# Patient Record
Sex: FEMALE | Race: ASIAN | NOT HISPANIC OR LATINO | Employment: FULL TIME | URBAN - METROPOLITAN AREA
[De-identification: names, ages, dates, MRNs, and addresses within clinical notes are randomized per-mention and may not be internally consistent; named-entity substitution may affect disease eponyms.]

---

## 2020-09-02 ENCOUNTER — OFFICE VISIT - HEALTHEAST (OUTPATIENT)
Dept: FAMILY MEDICINE | Facility: CLINIC | Age: 60
End: 2020-09-02

## 2020-09-02 DIAGNOSIS — I10 BENIGN ESSENTIAL HYPERTENSION: ICD-10-CM

## 2020-09-02 DIAGNOSIS — C50.919 MALIGNANT NEOPLASM OF FEMALE BREAST, UNSPECIFIED ESTROGEN RECEPTOR STATUS, UNSPECIFIED LATERALITY, UNSPECIFIED SITE OF BREAST (H): ICD-10-CM

## 2020-09-02 DIAGNOSIS — Z13.220 LIPID SCREENING: ICD-10-CM

## 2020-09-02 DIAGNOSIS — L29.9 EAR ITCH: ICD-10-CM

## 2020-09-02 DIAGNOSIS — R73.9 ELEVATED BLOOD SUGAR: ICD-10-CM

## 2020-09-02 LAB
ALBUMIN SERPL-MCNC: 4.2 G/DL (ref 3.5–5)
ALP SERPL-CCNC: 92 U/L (ref 45–120)
ALT SERPL W P-5'-P-CCNC: 77 U/L (ref 0–45)
ANION GAP SERPL CALCULATED.3IONS-SCNC: 15 MMOL/L (ref 5–18)
AST SERPL W P-5'-P-CCNC: 45 U/L (ref 0–40)
BILIRUB SERPL-MCNC: 0.6 MG/DL (ref 0–1)
BUN SERPL-MCNC: 7 MG/DL (ref 8–22)
CALCIUM SERPL-MCNC: 9.1 MG/DL (ref 8.5–10.5)
CHLORIDE BLD-SCNC: 104 MMOL/L (ref 98–107)
CHOLEST SERPL-MCNC: 205 MG/DL
CO2 SERPL-SCNC: 20 MMOL/L (ref 22–31)
CREAT SERPL-MCNC: 0.77 MG/DL (ref 0.6–1.1)
FASTING STATUS PATIENT QL REPORTED: ABNORMAL
GFR SERPL CREATININE-BSD FRML MDRD: >60 ML/MIN/1.73M2
GLUCOSE BLD-MCNC: 208 MG/DL (ref 70–125)
HBA1C MFR BLD: 8.5 %
HDLC SERPL-MCNC: 46 MG/DL
LDLC SERPL CALC-MCNC: 131 MG/DL
POTASSIUM BLD-SCNC: 4.1 MMOL/L (ref 3.5–5)
PROT SERPL-MCNC: 7.6 G/DL (ref 6–8)
SODIUM SERPL-SCNC: 139 MMOL/L (ref 136–145)
TRIGL SERPL-MCNC: 142 MG/DL

## 2020-09-02 RX ORDER — IBUPROFEN 600 MG/1
600 TABLET, FILM COATED ORAL EVERY 6 HOURS PRN
Status: ON HOLD | COMMUNITY
Start: 2020-02-14 | End: 2022-01-23

## 2020-09-02 RX ORDER — ATORVASTATIN CALCIUM 20 MG/1
20 TABLET, FILM COATED ORAL DAILY
Status: SHIPPED | COMMUNITY
Start: 2020-08-31 | End: 2022-01-20

## 2020-09-02 RX ORDER — EXEMESTANE 25 MG/1
25 TABLET ORAL DAILY
Status: SHIPPED | COMMUNITY
Start: 2020-08-28

## 2020-09-02 ASSESSMENT — MIFFLIN-ST. JEOR: SCORE: 1034.89

## 2020-09-03 ENCOUNTER — AMBULATORY - HEALTHEAST (OUTPATIENT)
Dept: FAMILY MEDICINE | Facility: CLINIC | Age: 60
End: 2020-09-03

## 2020-09-03 ENCOUNTER — COMMUNICATION - HEALTHEAST (OUTPATIENT)
Dept: FAMILY MEDICINE | Facility: CLINIC | Age: 60
End: 2020-09-03

## 2020-09-03 DIAGNOSIS — E11.9 TYPE 2 DIABETES MELLITUS WITHOUT COMPLICATION, WITHOUT LONG-TERM CURRENT USE OF INSULIN (H): ICD-10-CM

## 2020-09-25 ENCOUNTER — RECORDS - HEALTHEAST (OUTPATIENT)
Dept: ADMINISTRATIVE | Facility: OTHER | Age: 60
End: 2020-09-25

## 2020-11-20 ENCOUNTER — OFFICE VISIT - HEALTHEAST (OUTPATIENT)
Dept: FAMILY MEDICINE | Facility: CLINIC | Age: 60
End: 2020-11-20

## 2020-11-20 DIAGNOSIS — K21.9 GASTRO-ESOPHAGEAL REFLUX DISEASE WITHOUT ESOPHAGITIS: ICD-10-CM

## 2020-11-24 ENCOUNTER — COMMUNICATION - HEALTHEAST (OUTPATIENT)
Dept: FAMILY MEDICINE | Facility: CLINIC | Age: 60
End: 2020-11-24

## 2020-11-24 DIAGNOSIS — I10 BENIGN ESSENTIAL HYPERTENSION: ICD-10-CM

## 2020-11-24 DIAGNOSIS — E11.9 TYPE 2 DIABETES MELLITUS WITHOUT COMPLICATION, WITHOUT LONG-TERM CURRENT USE OF INSULIN (H): ICD-10-CM

## 2020-12-08 ENCOUNTER — OFFICE VISIT - HEALTHEAST (OUTPATIENT)
Dept: FAMILY MEDICINE | Facility: CLINIC | Age: 60
End: 2020-12-08

## 2020-12-08 DIAGNOSIS — E11.9 TYPE 2 DIABETES MELLITUS WITHOUT COMPLICATION, WITHOUT LONG-TERM CURRENT USE OF INSULIN (H): ICD-10-CM

## 2020-12-08 DIAGNOSIS — I10 BENIGN ESSENTIAL HYPERTENSION: ICD-10-CM

## 2020-12-08 DIAGNOSIS — C50.919 MALIGNANT NEOPLASM OF FEMALE BREAST, UNSPECIFIED ESTROGEN RECEPTOR STATUS, UNSPECIFIED LATERALITY, UNSPECIFIED SITE OF BREAST (H): ICD-10-CM

## 2020-12-08 RX ORDER — GLIPIZIDE 5 MG/1
5 TABLET ORAL DAILY
Qty: 90 TABLET | Refills: 1 | Status: SHIPPED | OUTPATIENT
Start: 2020-12-08 | End: 2022-01-20

## 2020-12-10 ENCOUNTER — COMMUNICATION - HEALTHEAST (OUTPATIENT)
Dept: FAMILY MEDICINE | Facility: CLINIC | Age: 60
End: 2020-12-10

## 2020-12-10 DIAGNOSIS — E11.9 TYPE 2 DIABETES MELLITUS WITHOUT COMPLICATION, WITHOUT LONG-TERM CURRENT USE OF INSULIN (H): ICD-10-CM

## 2020-12-12 RX ORDER — GLUCOSAMINE HCL/CHONDROITIN SU 500-400 MG
CAPSULE ORAL
Qty: 100 STRIP | Refills: 3 | Status: SHIPPED | OUTPATIENT
Start: 2020-12-12

## 2020-12-21 ENCOUNTER — RECORDS - HEALTHEAST (OUTPATIENT)
Dept: ADMINISTRATIVE | Facility: OTHER | Age: 60
End: 2020-12-21

## 2021-02-25 ENCOUNTER — RECORDS - HEALTHEAST (OUTPATIENT)
Dept: ADMINISTRATIVE | Facility: OTHER | Age: 61
End: 2021-02-25

## 2021-03-09 ENCOUNTER — OFFICE VISIT - HEALTHEAST (OUTPATIENT)
Dept: FAMILY MEDICINE | Facility: CLINIC | Age: 61
End: 2021-03-09

## 2021-03-09 DIAGNOSIS — E11.9 TYPE 2 DIABETES MELLITUS WITHOUT COMPLICATION, WITHOUT LONG-TERM CURRENT USE OF INSULIN (H): ICD-10-CM

## 2021-03-09 DIAGNOSIS — G56.02 CARPAL TUNNEL SYNDROME OF LEFT WRIST: ICD-10-CM

## 2021-03-09 DIAGNOSIS — C50.919 MALIGNANT NEOPLASM OF FEMALE BREAST, UNSPECIFIED ESTROGEN RECEPTOR STATUS, UNSPECIFIED LATERALITY, UNSPECIFIED SITE OF BREAST (H): ICD-10-CM

## 2021-03-09 DIAGNOSIS — I10 BENIGN ESSENTIAL HYPERTENSION: ICD-10-CM

## 2021-03-09 RX ORDER — BLOOD PRESSURE TEST KIT
1 KIT MISCELLANEOUS DAILY
Qty: 1 EACH | Refills: 0 | Status: SHIPPED | OUTPATIENT
Start: 2021-03-09

## 2021-03-10 ENCOUNTER — COMMUNICATION - HEALTHEAST (OUTPATIENT)
Dept: FAMILY MEDICINE | Facility: CLINIC | Age: 61
End: 2021-03-10

## 2021-03-12 ENCOUNTER — AMBULATORY - HEALTHEAST (OUTPATIENT)
Dept: NURSING | Facility: CLINIC | Age: 61
End: 2021-03-12

## 2021-03-17 ENCOUNTER — COMMUNICATION - HEALTHEAST (OUTPATIENT)
Dept: FAMILY MEDICINE | Facility: CLINIC | Age: 61
End: 2021-03-17

## 2021-03-17 DIAGNOSIS — I10 BENIGN ESSENTIAL HYPERTENSION: ICD-10-CM

## 2021-04-02 ENCOUNTER — AMBULATORY - HEALTHEAST (OUTPATIENT)
Dept: NURSING | Facility: CLINIC | Age: 61
End: 2021-04-02

## 2021-05-10 ENCOUNTER — RECORDS - HEALTHEAST (OUTPATIENT)
Dept: ADMINISTRATIVE | Facility: OTHER | Age: 61
End: 2021-05-10

## 2021-05-13 ENCOUNTER — COMMUNICATION - HEALTHEAST (OUTPATIENT)
Dept: FAMILY MEDICINE | Facility: CLINIC | Age: 61
End: 2021-05-13

## 2021-05-13 DIAGNOSIS — I10 BENIGN ESSENTIAL HYPERTENSION: ICD-10-CM

## 2021-05-13 RX ORDER — LISINOPRIL 30 MG/1
TABLET ORAL
Qty: 90 TABLET | Refills: 2 | Status: SHIPPED | OUTPATIENT
Start: 2021-05-13 | End: 2022-01-20

## 2021-05-22 ENCOUNTER — COMMUNICATION - HEALTHEAST (OUTPATIENT)
Dept: FAMILY MEDICINE | Facility: CLINIC | Age: 61
End: 2021-05-22

## 2021-05-22 DIAGNOSIS — I10 BENIGN ESSENTIAL HYPERTENSION: ICD-10-CM

## 2021-06-05 VITALS
OXYGEN SATURATION: 95 % | WEIGHT: 129 LBS | HEIGHT: 58 IN | HEART RATE: 94 BPM | DIASTOLIC BLOOD PRESSURE: 90 MMHG | SYSTOLIC BLOOD PRESSURE: 168 MMHG | BODY MASS INDEX: 27.08 KG/M2

## 2021-06-05 VITALS
SYSTOLIC BLOOD PRESSURE: 158 MMHG | HEART RATE: 94 BPM | DIASTOLIC BLOOD PRESSURE: 80 MMHG | OXYGEN SATURATION: 98 % | BODY MASS INDEX: 24.45 KG/M2 | WEIGHT: 117 LBS

## 2021-06-05 VITALS
HEART RATE: 91 BPM | DIASTOLIC BLOOD PRESSURE: 80 MMHG | WEIGHT: 117 LBS | BODY MASS INDEX: 24.45 KG/M2 | OXYGEN SATURATION: 99 % | SYSTOLIC BLOOD PRESSURE: 160 MMHG

## 2021-06-11 NOTE — TELEPHONE ENCOUNTER
Patient Returning Call  Reason for call:  Call back  Information relayed to patient:  Writer relayed message to Pt's : Message from Elmer Emmanuel CNP sent at 9/3/2020  1:39 PM CDT -----  Please call the patient.          Elevated blood sugars which she knows about.  Her A1c which we would like closer to 7% is at 8.5%.  Kidneys are fine.  Mild increase in liver enzymes.  We will get these sent off to her oncologist like she requested.     In the meantime, I would like to start a medication called metformin.  She can slowly increase this medication over 2 weeks by following the directions on the bottle.  Hopefully this will be enough to keep her sugars under control while she is going through treatment, but we can always adjust as needed.  I double checked and this medication should not cause any interactions with her cancer therapy.     I would recommend that she check her blood sugar once a day in the morning before breakfast and write this number down so that we can review in 3 months.  If consistently >250, let me know.  If she needs blood testing supplies, let me know and I can get the sent to the pharmacy.     Check back in 3-months so that we can recheck labs and see how her sugars are progressing.     Elmer Emmanuel CNP      agrees, and understands.  Pt does need Diabetes supplies, and her Oncologist is Dr. Anne-Marie Giraldo @ [facility] in [place].     Patient has additional questions:  No  If YES, what are your questions/concerns:  N/A  Okay to leave a detailed message?: No call back needed

## 2021-06-11 NOTE — TELEPHONE ENCOUNTER
Left message to call back for: Pt.   Information to relay to patient:      1. Information below.     2. Please ask pt who her oncologist is and with what company so that we can fax results to them.    Of note. Lab results letter with information below has been mailed out.     Anusha Fritz [facility]

## 2021-06-11 NOTE — TELEPHONE ENCOUNTER
Prescription for generic glucometer, lancets, and strips sent to the pharmacy.  Please let them know.  The pharmacy should dispense all of the supplies based on her insurance.    Elmer Emmanuel, CNP

## 2021-06-11 NOTE — TELEPHONE ENCOUNTER
Labs internally faxed to Dr. Anne-Marie Giraldo.     Pt needs diabetic supplies. Please send in.     Anusha Fritz, CMA

## 2021-06-11 NOTE — PATIENT INSTRUCTIONS - HE
Let's increase your lisinopril to 15 mg.  I did send a new prescription to the pharmacy that reflects this.      If at your follow-up appointments with oncology your blood pressure is still >140/90, let us know and we can adjust your medication up.    We are getting some lab work done today to assess your kidney function and blood sugars.  Once we get these results back we will call you with the plan to get your blood sugars under control.

## 2021-06-11 NOTE — TELEPHONE ENCOUNTER
----- Message from Elmer Emmanuel CNP sent at 9/3/2020  1:39 PM CDT -----  Please call the patient.        Elevated blood sugars which she knows about.  Her A1c which we would like closer to 7% is at 8.5%.  Kidneys are fine.  Mild increase in liver enzymes.  We will get these sent off to her oncologist like she requested.    In the meantime, I would like to start a medication called metformin.  She can slowly increase this medication over 2 weeks by following the directions on the bottle.  Hopefully this will be enough to keep her sugars under control while she is going through treatment, but we can always adjust as needed.  I double checked and this medication should not cause any interactions with her cancer therapy.    I would recommend that she check her blood sugar once a day in the morning before breakfast and write this number down so that we can review in 3 months.  If consistently >250, let me know.  If she needs blood testing supplies, let me know and I can get the sent to the pharmacy.    Check back in 3-months so that we can recheck labs and see how her sugars are progressing.    Elmer Emmanuel CNP

## 2021-06-13 NOTE — PATIENT INSTRUCTIONS - HE
We can try cutting down the glipizide down to only the morning. If you notice sugars getting higher than 150, go ahead and restart twice daily.     I'm under the assumption that your A1c will be better once on the glipizide for at least 3 months. We'll check again in March.     You can try cutting the atorvastatin in half.

## 2021-06-13 NOTE — TELEPHONE ENCOUNTER
Medication Question or Clarification  Who is calling: Patient   What medication are you calling about (include dose and sig)?: metFORMIN (GLUCOPHAGE) 500 MG tablet 360 tablet 0 9/3/2020    Si tab AM daily x3 days then 1 AM 1PM x3 days then 2AM 1 PM x3 days then 2 tablets twice daily.   Sent to pharmacy as: metFORMIN 500 mg tablet (Glucophage)   Who prescribed the medication?: Elmer Emmanuel CNP  What is your question/concern?: Patient states that she is on metformin since September she is having stomach upset every day if she drinks something or eat pain in abdomin and gas comes up to her neck and having throat irritation . Patient went to urgent care for abdominal pain on 20. Per patient she is also on cancer medications. Patient also stated that her blood glucose levels are in between 140 to 130 . Patient is asking can provider prescribe alternative medication or decrease the metformin dose to one time a day . Please advise      Requested Pharmacy: [facility] # [id number]  Okay to leave a detailed message?: No

## 2021-06-13 NOTE — TELEPHONE ENCOUNTER
Request from [facility] pharmacy to refill patient's test strip and lancet prescriptions.    12/10/2020

## 2021-06-13 NOTE — TELEPHONE ENCOUNTER
Last Med Check: 09/02/2020    Next med check due on: 01/02/2021    CSA on File: N/A    Future Appointment Scheduled ? No    Last Med Refill? 09/02/2020    Booker Whitney LPN

## 2021-06-13 NOTE — TELEPHONE ENCOUNTER
Refill Approved    Rx renewed per Medication Renewal Policy. Medication was last renewed on 9/8/20, last OV 12/8/20.    Kasandra Ram, [facility] Triage/Med Refill 12/12/2020     Requested Prescriptions   Pending Prescriptions Disp Refills     blood glucose test strips 100 strip 0     Sig: Test once daily. Dispense brand per patient's insurance at pharmacy discretion.       Diabetic Supplies Refill Protocol Passed - 12/10/2020  4:08 PM        Passed - Visit with PCP or prescribing provider visit in last 6 months     Last office visit with prescriber/PCP: 12/8/2020 Elmer Emmanuel CNP OR same dept: 12/8/2020 Elmer Emmanuel CNP OR same specialty: 12/8/2020 Elmer Emmanuel CNP  Last physical: Visit date not found Last MTM visit: Visit date not found   Next visit within 3 mo: Visit date not found  Next physical within 3 mo: Visit date not found  Prescriber OR PCP: Elmer Emmanuel CNP  Last diagnosis associated with med order: 1. Type 2 diabetes mellitus without complication, without long-term current use of insulin (H)  - blood glucose test strips; Test once daily. Dispense brand per patient's insurance at pharmacy discretion.  Dispense: 100 strip; Refill: 0  - generic lancets (FINGERSTIX LANCETS); Test once daily. Dispense brand per patient's insurance at pharmacy discretion.  Dispense: 100 each; Refill: 0    If protocol passes may refill for 12 months if within 3 months of last provider visit (or a total of 15 months).             Passed - A1C in last 6 months     Hemoglobin A1c   Date Value Ref Range Status   09/02/2020 8.5 (H) <=5.6 % Final     Comment:     Normal <5.7% Prediabete 5.7-6.4% Diabletes 6.5% or higher - adopted from ADA consensus guidelines                  generic lancets (FINGERSTIX LANCETS) 100 each 0     Sig: Test once daily. Dispense brand per patient's insurance at pharmacy discretion.       Diabetic Supplies Refill Protocol Passed - 12/10/2020  4:08 PM        Passed - Visit with PCP  or prescribing provider visit in last 6 months     Last office visit with prescriber/PCP: 12/8/2020 Elmer Emmanuel CNP OR same dept: 12/8/2020 Elmer Emmanuel CNP OR same specialty: 12/8/2020 Elmer Emmanuel CNP  Last physical: Visit date not found Last MTM visit: Visit date not found   Next visit within 3 mo: Visit date not found  Next physical within 3 mo: Visit date not found  Prescriber OR PCP: Elmer Emmanuel CNP  Last diagnosis associated with med order: 1. Type 2 diabetes mellitus without complication, without long-term current use of insulin (H)  - blood glucose test strips; Test once daily. Dispense brand per patient's insurance at pharmacy discretion.  Dispense: 100 strip; Refill: 0  - generic lancets (FINGERSTIX LANCETS); Test once daily. Dispense brand per patient's insurance at pharmacy discretion.  Dispense: 100 each; Refill: 0    If protocol passes may refill for 12 months if within 3 months of last provider visit (or a total of 15 months).             Passed - A1C in last 6 months     Hemoglobin A1c   Date Value Ref Range Status   09/02/2020 8.5 (H) <=5.6 % Final     Comment:     Normal <5.7% Prediabete 5.7-6.4% Diabletes 6.5% or higher - adopted from ADA consensus guidelines

## 2021-06-13 NOTE — PROGRESS NOTES
Chief Complaint   Patient presents with     Diabetes     Recheck glucose log per Elmer's request       HPI: Patient presents today for diabetic check.  Initially put on Metformin but she was unable to taper up due to significant GI upset.  Switched over to glipizide.  Blood sugars have shown good control.  Previously fasting sugars were 160s now down to 120s.  No hypoglycemic events.  Taking glipizide 5 mg twice daily before meals.  Her chemotherapy has also been halved and she thinks this also is helping with her blood sugars.  No tremors.  Has had some oral pain from the chemo.  Has followed up with a dentist in the past for this.  Started to use Sensodyne toothpaste.  GI issues resolved once Metformin was discontinued.    ROS:Review of Systems - negative except for what's listed in the HPI    SH: The Patient's  reports that she has never smoked. She has never used smokeless tobacco. She reports previous alcohol use. She reports that she does not use drugs.      FH: The Patient's family history includes Breast cancer in her sister; Cancer in her mother; Hyperlipidemia in her brother; Hypertension in her mother; Lymphoma in her sister; Transient ischemic attack in her father.     Meds:    Current Outpatient Medications on File Prior to Visit   Medication Sig Dispense Refill     AFINITOR 10 mg Tab        atorvastatin (LIPITOR) 20 MG tablet Take 20 mg by mouth daily.       blood glucose meter (GLUCOMETER) Use 1 each As Directed as needed. Dispense glucometer, needle, and strip brand per patient's insurance at pharmacy discretion. 1 each 0     blood glucose test strips Test once daily. Dispense brand per patient's insurance at pharmacy discretion. 100 strip 0     exemestane (AROMASIN) 25 mg tablet TK 1 T PO QD PC       generic lancets (FINGERSTIX LANCETS) Test once daily. Dispense brand per patient's insurance at pharmacy discretion. 100 each 0     ibuprofen (ADVIL,MOTRIN) 600 MG tablet        metFORMIN (GLUCOPHAGE)  500 MG tablet 1 tab AM daily x3 days then 1 AM 1PM x3 days then 2AM 1 PM x3 days then 2 tablets twice daily. 360 tablet 0     omeprazole (PRILOSEC) 20 MG capsule Take 1 capsule (20 mg total) by mouth daily before breakfast. 30 minutes before meal 30 capsule 0     [DISCONTINUED] glipiZIDE (GLUCOTROL) 5 MG tablet Take 1 tablet (5 mg total) by mouth 2 (two) times a day before meals. 1/2 Hour BEFORE meals 60 tablet 1     [DISCONTINUED] lisinopriL (PRINIVIL,ZESTRIL) 10 MG tablet Take 1.5 tablets (15 mg total) by mouth daily. 135 tablet 0     No current facility-administered medications on file prior to visit.        O:  /80 (Patient Site: Right Arm, Patient Position: Sitting, Cuff Size: Adult Regular)   Pulse 94   Wt 117 lb (53.1 kg)   SpO2 98%   BMI 24.45 kg/m      Physical Examination:   General appearance - alert, well appearing, and in no distress  Mental status - alert, oriented to person, place, and time  Eyes -sclera white  Neurological - alert, oriented, normal speech, no focal findings or movement disorder noted, neck supple without rigidity, no tremors, strength 5/5  Extremities - no peripheral edema  Skin - normal coloration and turgor.      A/P:     Problem List Items Addressed This Visit        Edg Concept Cardiac Problems    Benign essential hypertension - Primary    Relevant Medications    lisinopriL (PRINIVIL,ZESTRIL) 10 MG tablet       Other    Breast cancer (H)      Other Visit Diagnoses     Type 2 diabetes mellitus without complication, without long-term current use of insulin (H)        Relevant Medications    glipiZIDE (GLUCOTROL) 5 MG tablet        Blood sugars look considerably better.  Sounds like this is also in conjunction with adjustments to her chemotherapy.  Okay to try cutting glipizide down to once daily in the morning per her request.  If sugars rise though, get back to twice daily.  A1c was checked after being on glipizide for only a couple weeks so no point in rechecking today.   Follow-up in the springtime.  Patient is hesitant to make adjustments to the lisinopril noting good blood pressure control outside of the clinic.  If elevated at oncology, should really increase lisinopril.        1. Type 2 diabetes mellitus without complication, without long-term current use of insulin (H)  - glipiZIDE (GLUCOTROL) 5 MG tablet; Take 1 tablet (5 mg total) by mouth daily. 1/2 Hour BEFORE meals  Dispense: 90 tablet; Refill: 1    2. Benign essential hypertension  - lisinopriL (PRINIVIL,ZESTRIL) 10 MG tablet; Take 1.5 tablets (15 mg total) by mouth daily.  Dispense: 135 tablet; Refill: 0    3. Malignant neoplasm of female breast, unspecified estrogen receptor status, unspecified laterality, unspecified site of breast (H)        Elmer Emmanuel, CNP      This note has been dictated using voice recognition software. Any grammatical or context distortions are unintentional and inherent to the software.

## 2021-06-15 NOTE — TELEPHONE ENCOUNTER
Please call the patient.  At her visit yesterday we were talking about the Covid vaccine.  A couple hours after our visit I saw that the governor made an announcement about getting people in her situation (under 65 with cancer).  She was correct then that she is just about ready for vaccination.  I do not know what [place]'s plans are, but she can see about getting this through the department of health while [place] still works on our 65+ crowd    Elmer Emmanuel, CNP

## 2021-06-15 NOTE — PATIENT INSTRUCTIONS - HE
Blood pressure is too high.  The compromise is increasing the lisinopril to 20mg from 15mg.  I will send a prescription for a blood pressure cuff as well. If higher than 140 please tell me next week and we will want to increase further.  This is important to reduce your risk of heart attack and stroke.     If you want to get the carpal tunnel addressed with orthopedics, let me know.       Right now [place] is vaccinating those in the 65+ age range.    About once a week, [place] release a block of appointments based on how many vaccines we are getting that week.    We've been told to direct patients to the website [email or web address] for information and to sign up.    You can also call [phone] to see if there's appointment times available.

## 2021-06-15 NOTE — PROGRESS NOTES
Chief Complaint   Patient presents with     Diabetes     Blood sugars are under 120     Wrist Pain     3 days, darkened color(bruising) and a little pain - no known injury     Numbness     In a couple fingers on the left hand        HPI: Patient presents today for diabetic check.  Last A1c reviewed from outside the clinic on 2/25/2021 shows 6.8%.  No hypoglycemic events.  Continues on glipizide but only taking 5 mg daily.  Metformin caused too much GI upset.  Reviewed recent metabolic panel through oncology.  Reviewed last office visit note.  Continues for treatment of breast cancer.    Pressure today is elevated.  Previously was elevated with me and at the oncologist office.  She has been extremely resistant to increasing her antihypertensives.  She attributes her hypertension to her breast cancer and anxiety.  Irregardless of the cause, she has been slow to make medication adjustments.  No chest pain, palpitations, lightheadedness, dizziness, numbness, tingling, lower extremity swelling, vision changes.    Patient has been dealing with pain along her left wrist with associated tingling at the tips of her index and middle finger.  Atraumatic.  Had similar episodes about 3 years ago and was diagnosed with carpal tunnel.  Did receive cortisone injection which worked quite well.  He has started to develop right wrist issues as well with some swelling and bruising.      ROS:Review of Systems - negative except for what's listed in the HPI    SH: The Patient's  reports that she has never smoked. She has never used smokeless tobacco. She reports previous alcohol use. She reports that she does not use drugs.      FH: The Patient's family history includes Breast cancer in her sister; Cancer in her mother; Hyperlipidemia in her brother; Hypertension in her mother; Lymphoma in her sister; Transient ischemic attack in her father.     Meds:    Current Outpatient Medications on File Prior to Visit   Medication Sig Dispense  Refill     atorvastatin (LIPITOR) 20 MG tablet Take 20 mg by mouth daily.       blood glucose meter (GLUCOMETER) Use 1 each As Directed as needed. Dispense glucometer, needle, and strip brand per patient's insurance at pharmacy discretion. 1 each 0     blood glucose test strips Test once daily. Dispense brand per patient's insurance at pharmacy discretion. 100 strip 3     exemestane (AROMASIN) 25 mg tablet TK 1 T PO QD PC       generic lancets (FINGERSTIX LANCETS) Test once daily. Dispense brand per patient's insurance at pharmacy discretion. 100 each 3     glipiZIDE (GLUCOTROL) 5 MG tablet Take 1 tablet (5 mg total) by mouth daily. 1/2 Hour BEFORE meals 90 tablet 1     ibuprofen (ADVIL,MOTRIN) 600 MG tablet        [DISCONTINUED] lisinopriL (PRINIVIL,ZESTRIL) 10 MG tablet Take 1.5 tablets (15 mg total) by mouth daily. 135 tablet 0     [DISCONTINUED] AFINITOR 10 mg Tab        [DISCONTINUED] metFORMIN (GLUCOPHAGE) 500 MG tablet 1 tab AM daily x3 days then 1 AM 1PM x3 days then 2AM 1 PM x3 days then 2 tablets twice daily. 360 tablet 0     No current facility-administered medications on file prior to visit.        O:  /80   Pulse 91   Wt 117 lb (53.1 kg)   SpO2 99%   BMI 24.45 kg/m      Physical Examination:   General appearance - alert, well appearing, and in no distress  Mental status - alert, oriented to person, place, and time  Eyes -PERRLA  Neck - no significant adenopathy  Lymphatics - no palpable lymphadenopathy  Chest - clear to auscultation, no wheezes, rales or rhonchi, symmetric air entry  Heart - normal rate and regular rhythm, S1 and S2 normal, no murmurs noted  Abdomen - soft, nontender, nondistended, no masses or organomegaly  Neurological - alert, oriented, normal speech, no focal findings or movement disorder noted, neck supple without rigidity, cranial nerves II through XII intact, motor and sensory grossly normal bilaterally, normal muscle tone, no tremors, strength 5/5  Skin skeletal.   Positive Tinel on the left wrist.   strength 5/5.  Extremities - peripheral pulses normal, no peripheral edema  Skin - normal coloration and turgor.      A/P:     Problem List Items Addressed This Visit        Edg Concept Cardiac Problems    Benign essential hypertension    Relevant Medications    lisinopriL (PRINIVIL,ZESTRIL) 20 MG tablet    blood pressure monitor Kit    Diabetes mellitus, type 2 (H)       Other    Breast cancer (H)    Carpal tunnel syndrome of left wrist - Primary        For the carpal tunnel, she would like to take a conservative approach.  Over-the-counter acetaminophen.  Use a wrist guard.  Topical heat/ice if needed.  Continue to follow with oncology.  Reviewed last 2 office notes along with CMP, CBC, and A1c.  Diabetic control has been remarkably better.  Continue same glipizide.  For the blood pressure, I recommended patient be more aggressive with treating this.  She declines.  She is only willing to increase her lisinopril 5 mg at a time.  The compromise is that I would like for her to check her blood pressures more regulairly and if weekly still >140 systolic, adjust again.    1. Carpal tunnel syndrome of left wrist    2. Malignant neoplasm of female breast, unspecified estrogen receptor status, unspecified laterality, unspecified site of breast (H)    3. Type 2 diabetes mellitus without complication, without long-term current use of insulin (H)    4. Benign essential hypertension  - lisinopriL (PRINIVIL,ZESTRIL) 20 MG tablet; Take 1 tablet (20 mg total) by mouth daily.  Dispense: 90 tablet; Refill: 0  - blood pressure monitor Kit; Use 1 kit As Directed daily.  Dispense: 1 each; Refill: 0    Total time spent was at least 40 minutes including reviewing records prior to arrival, consultation, placing orders, education, and reviewing the plan of care on the date of service.        Elmer Emmanuel, CNP      This note has been dictated using voice recognition software. Any grammatical or  context distortions are unintentional and inherent to the software.

## 2021-06-16 PROBLEM — C50.919 BREAST CANCER (H): Status: ACTIVE | Noted: 2020-09-02

## 2021-06-16 PROBLEM — I10 BENIGN ESSENTIAL HYPERTENSION: Status: ACTIVE | Noted: 2020-09-02

## 2021-06-16 PROBLEM — G56.02 CARPAL TUNNEL SYNDROME OF LEFT WRIST: Status: ACTIVE | Noted: 2021-03-09

## 2021-06-16 PROBLEM — E11.9 DIABETES MELLITUS, TYPE 2 (H): Status: ACTIVE | Noted: 2020-12-08

## 2021-06-16 NOTE — TELEPHONE ENCOUNTER
Reason for Call:  Other call back      Detailed comments: pt calling in with her b/p readings:  137/87 140/40's last week  and yesterday it was tues 153/92/    Phone Number Patient can be reached at:   Cell number on file:    Telephone Information:   Mobile [phone]       Best Time:     Can we leave a detailed message on this number?: Yes    Call taken on 3/17/2021 at 9:00 AM by Milly Mir

## 2021-06-16 NOTE — TELEPHONE ENCOUNTER
Better but still not quite at goal.  I am sure she is going to only want to increase to 25 mg.  Unfortunately they do not make a 25 mg tablet and insurance usually will only allow us to send 1 dosage at a time so next prescription should be for 30 mg which I'll send off to the pharmacy. Keep checking pressure and let me know if systolic is creeping >140 still.    Elmer Emmanuel, CNP

## 2021-06-17 NOTE — TELEPHONE ENCOUNTER
lisinopriL (PRINIVIL,ZESTRIL) 10 MG tablet [[id number]]    Electronically signed by: Elmer Emmanuel CNP on 12/08/20 0847 Status: Discontinued   Ordering user: Elmer Emmanuel CNP 12/08/20 0847 Authorized by: Elmer Emmanuel CNP   Frequency: DAILY 12/08/20 - 03/09/21  Discontinued by: Elmer Emmanuel CNP 03/09/21 0813 [Reorder]   Diagnoses   Benign essential hypertension [I10]

## 2021-06-17 NOTE — TELEPHONE ENCOUNTER
Refill Approved    Rx renewed per Medication Renewal Policy. Medication was last renewed on 3/18/21, last OV 3/9/21.    Kasandra Ram, [facility] Triage/Med Refill 5/13/2021     Requested Prescriptions   Pending Prescriptions Disp Refills     lisinopriL (PRINIVIL,ZESTRIL) 30 MG tablet [Pharmacy Med Name: LISINOPRIL 30MG TABLETS] 60 tablet 0     Sig: TAKE 1 TABLET(30 MG) BY MOUTH DAILY       [employer] Inhibitors Refill Protocol Passed - 5/13/2021  8:48 AM        Passed - PCP or prescribing provider visit in past 12 months       Last office visit with prescriber/PCP: 3/9/2021 Elmer Emmanuel CNP OR same dept: 3/9/2021 Elmer Emmanuel CNP OR same specialty: 3/9/2021 Elmer Emmanuel CNP  Last physical: Visit date not found Last MTM visit: Visit date not found   Next visit within 3 mo: Visit date not found  Next physical within 3 mo: Visit date not found  Prescriber OR PCP: Elmer Emmanuel CNP  Last diagnosis associated with med order: 1. Benign essential hypertension  - lisinopriL (PRINIVIL,ZESTRIL) 30 MG tablet [Pharmacy Med Name: LISINOPRIL 30MG TABLETS]; TAKE 1 TABLET(30 MG) BY MOUTH DAILY  Dispense: 60 tablet; Refill: 0    If protocol passes may refill for 12 months if within 3 months of last provider visit (or a total of 15 months).             Passed - Serum Potassium in past 12 months     Lab Results   Component Value Date    Potassium 4.1 09/02/2020             Passed - Blood pressure filed in past 12 months     BP Readings from Last 1 Encounters:   03/09/21 160/80             Passed - Serum Creatinine in past 12 months     Creatinine   Date Value Ref Range Status   09/02/2020 0.77 0.60 - 1.10 mg/dL Final

## 2021-06-18 NOTE — PATIENT INSTRUCTIONS - HE
Patient Instructions by Maurilio Hathaway PA-C at 11/20/2020  5:34 PM     Author: Maurilio Hathaway PA-C Service: -- Author Type: Physician Assistant    Filed: 11/20/2020  5:34 PM Encounter Date: 11/20/2020 Status: Signed    : Maurilio Hathaway PA-C (Physician Assistant)           GERD (Adult)    The esophagus is a tube that carries food from the mouth to the stomach. A valve at the lower end of the esophagus prevents stomach acid from flowing upward. When this valve doesn't work properly, stomach contents may repeatedly flow back up (reflux) into the esophagus. This is called gastroesophageal reflux disease (GERD). GERD can irritate the esophagus. It can cause problems with swallowing or breathing. In severe cases, GERD can cause recurrent pneumonia or other serious problems.  Symptoms of reflux include burning, pressure or sharp pain in the upper abdomen or mid to lower chest. The pain can spread to the neck, back, or shoulder. There may be belching, an acid taste in the back of the throat, chronic cough, or sore throat or hoarseness. GERD symptoms often occur during the day after a big meal. They can also occur at night when lying down.   Home care  Lifestyle changes can help reduce symptoms. If needed, medicines may be prescribed. Symptoms often improve with treatment, but if treatment is stopped, the symptoms often return after a few months. So most persons with GERD will need to continue treatment.  Lifestyle changes    Limit or avoid fatty, fried, and spicy foods, as well as coffee, chocolate, mint, and foods with high acid content such as tomatoes and citrus fruit and juices (orange, grapefruit, lemon).    Dont eat large meals, especially at night. Frequent, smaller meals are best. Do not lie down right after eating. And dont eat anything 3 hours before going to bed.    Avoid drinking alcohol and smoking. As much as possible, stay away from second hand smoke.    If you are overweight, losing  "weight will reduce symptoms.     Avoid wearing tight clothing around your stomach area.    If your symptoms occur during sleep, use a foam wedge to elevate your upper body (not just your head.) Or, place 4\" blocks under the head of your bed.  Medicines  If needed, medicines can help relieve the symptoms of GERD and prevent damage to the esophagus. Discuss a medicine plan with your healthcare provider. This may include one or more of the following medicines:    Antacids to help neutralize the normal acids in your stomach.    Acid blockers (H2 blockers) to decrease acid production.    Acid inhibitors (PPIs) to decrease acid production in a different way than the blockers. They may work better, but can take a little longer to take effect.  Take an antacid 30-60 minutes after eating and at bedtime, but not at the same time as an acid blocker.  Try not to take medicines such as ibuprofen and aspirin. If you are taking aspirin for your heart or other medical reasons, talk to your healthcare provider about stopping it.  Follow-up care  Follow up with your healthcare provider or as advised by our staff.  When to seek medical advice  Call your healthcare provider if any of the following occur:    Stomach pain gets worse or moves to the lower right abdomen (appendix area)    Chest pain appears or gets worse, or spreads to the back, neck, shoulder, or arm    Frequent vomiting (cant keep down liquids)    Blood in the stool or vomit (red or black in color)    Feeling weak or dizzy    Fever of 100.4 F (38 C) or higher, or as directed by your healthcare provider  Date Last Reviewed: 6/23/2015 2000-2017 [employer]. [address] [place] [address], [place] [address]. All rights reserved. This information is not intended as a substitute for professional medical care. Always follow your healthcare professional's instructions.             "

## 2021-06-20 NOTE — LETTER
Letter by Elmer Emmanuel CNP at      Author: Elmer Emmanuel CNP Service: -- Author Type: --    Filed:  Encounter Date: 9/3/2020 Status: (Other)         Sierra Pelaez  [address] [facility] [address]             September 3, 2020         Dear Ms. Pelaez,    Below are the results from your recent visit:    Resulted Orders   Comprehensive Metabolic Panel   Result Value Ref Range    Sodium 139 136 - 145 mmol/L    Potassium 4.1 3.5 - 5.0 mmol/L    Chloride 104 98 - 107 mmol/L    CO2 20 (L) 22 - 31 mmol/L    Anion Gap, Calculation 15 5 - 18 mmol/L    Glucose 208 (H) 70 - 125 mg/dL    BUN 7 (L) 8 - 22 mg/dL    Creatinine 0.77 0.60 - 1.10 mg/dL    GFR MDRD Af Amer >60 >60 mL/min/1.73m2    GFR MDRD Non Af Amer >60 >60 mL/min/1.73m2    Bilirubin, Total 0.6 0.0 - 1.0 mg/dL    Calcium 9.1 8.5 - 10.5 mg/dL    Protein, Total 7.6 6.0 - 8.0 g/dL    Albumin 4.2 3.5 - 5.0 g/dL    Alkaline Phosphatase 92 45 - 120 U/L    AST 45 (H) 0 - 40 U/L    ALT 77 (H) 0 - 45 U/L    Narrative    Fasting Glucose reference range is 70-99 mg/dL per  American Diabetes Association (ADA) guidelines.   Glycosylated Hemoglobin A1c   Result Value Ref Range    Hemoglobin A1c 8.5 (H) <=5.6 %      Comment:      Normal <5.7% Prediabete 5.7-6.4% Diabletes 6.5% or higher - adopted from ADA consensus guidelines   Lipid [place] FASTING   Result Value Ref Range    Cholesterol 205 (H) <=199 mg/dL    Triglycerides 142 <=149 mg/dL    HDL Cholesterol 46 (L) >=50 mg/dL    LDL Calculated 131 (H) <=129 mg/dL    Patient Fasting > 8hrs? Unknown         Elevated blood sugars which you know about.  Your A1c, which we would like closer to 7%, is  at 8.5%.  Kidneys are fine.  Mild increase in liver enzymes.  We will get these sent off to your  oncologist like you requested.      In the meantime, I would like to start a medication called metformin.  You can slowly increase  this medication over 2 weeks by following the directions on the bottle.  Hopefully this will be   enough to keep your sugars under control while you are going through treatment, but we can  always adjust as needed.  I double checked and this medication should not cause any  interactions with yourcancer therapy.      I would recommend that you check her blood sugar once a day in the morning before breakfast and write this number down so that we can review in 3 months.  If consistently >250,  let me know.  If you need blood testing supplies, let me know and I can get them sent to the  pharmacy.      Check back in 3-months so that we can recheck labs and see how your sugars are progressing.       Please call with questions or contact us using [employer].        Sincerely,            Electronically signed by Elmer Emmanuel CNP

## 2021-06-23 ENCOUNTER — COMMUNICATION - HEALTHEAST (OUTPATIENT)
Dept: FAMILY MEDICINE | Facility: CLINIC | Age: 61
End: 2021-06-23

## 2021-06-23 DIAGNOSIS — E11.9 TYPE 2 DIABETES MELLITUS WITHOUT COMPLICATION, WITHOUT LONG-TERM CURRENT USE OF INSULIN (H): ICD-10-CM

## 2021-06-23 RX ORDER — GLIPIZIDE 5 MG/1
TABLET ORAL
Qty: 90 TABLET | Refills: 0 | Status: SHIPPED | OUTPATIENT
Start: 2021-06-23 | End: 2022-01-20

## 2021-06-26 ENCOUNTER — HEALTH MAINTENANCE LETTER (OUTPATIENT)
Age: 61
End: 2021-06-26

## 2021-06-26 NOTE — TELEPHONE ENCOUNTER
RN cannot approve Refill Request    RN can NOT refill this medication PCP messaged that patient is overdue for Labs. Last office visit: 3/9/2021 Elmer Emmanuel CNP Last Physical: Visit date not found Last MTM visit: Visit date not found Last visit same specialty: 3/9/2021 Elmer Emmanuel CNP.  Next visit within 3 mo: Visit date not found  Next physical within 3 mo: Visit date not found      Juany Negro, [facility] Triage/Med Refill 6/23/2021    Requested Prescriptions   Pending Prescriptions Disp Refills     glipiZIDE (GLUCOTROL) 5 MG tablet [Pharmacy Med Name: GLIPIZIDE 5MG TABLETS] 90 tablet 1     Sig: TAKE 1 TABLET BY MOUTH DAILY 1/2 HOUR BEFORE MEALS       Oral Hypoglycemics Refill Protocol Failed - 6/22/2021  8:58 AM        Failed - A1C in last 6 months     Hemoglobin A1c   Date Value Ref Range Status   09/02/2020 8.5 (H) <=5.6 % Final     Comment:     Normal <5.7% Prediabete 5.7-6.4% Diabletes 6.5% or higher - adopted from ADA consensus guidelines               Failed - Microalbumin in last year      No results found for: MICROALBUR               Passed - Visit with PCP or prescribing provider visit in last 6 months       Last office visit with prescriber/PCP: 3/9/2021 OR same dept: 3/9/2021 Elmer Emmanuel CNP OR same specialty: 3/9/2021 Elmer Emmanuel CNP Last physical: Visit date not found Last MTM visit: Visit date not found         Next appt within 3 mo: Visit date not found  Next physical within 3 mo: Visit date not found  Prescriber OR PCP: Elmer Emmanuel CNP  Last diagnosis associated with med order: 1. Type 2 diabetes mellitus without complication, without long-term current use of insulin (H)  - glipiZIDE (GLUCOTROL) 5 MG tablet [Pharmacy Med Name: GLIPIZIDE 5MG TABLETS]; TAKE 1 TABLET BY MOUTH DAILY 1/2 HOUR BEFORE MEALS  Dispense: 90 tablet; Refill: 1     If protocol passes may refill for 12 months if within 3 months of last provider visit (or a total of 15 months).            Passed - Blood pressure in last year     BP Readings from Last 1 Encounters:   03/09/21 160/80             Passed - Serum creatinine in last year     Creatinine   Date Value Ref Range Status   09/02/2020 0.77 0.60 - 1.10 mg/dL Final

## 2021-06-30 NOTE — PROGRESS NOTES
Progress Notes by Maurilio Hathaway PA-C at 11/20/2020  5:30 PM     Author: Maurilio Hathaway PA-C Service: -- Author Type: Physician Assistant    Filed: 11/20/2020  5:35 PM Encounter Date: 11/20/2020 Status: Signed    : Maurilio Hathaway PA-C (Physician Assistant)       Provider E-Visit time total (minutes): 10      Assessment:   The encounter diagnosis was Gastro-esophageal reflux disease without esophagitis.     Plan:     Medications Ordered   Medications   ? omeprazole (PRILOSEC) 20 MG capsule     Sig: Take 1 capsule (20 mg total) by mouth daily before breakfast. 30 minutes before meal     Dispense:  30 capsule     Refill:  0     Patient Instructions       GERD (Adult)    The esophagus is a tube that carries food from the mouth to the stomach. A valve at the lower end of the esophagus prevents stomach acid from flowing upward. When this valve doesn't work properly, stomach contents may repeatedly flow back up (reflux) into the esophagus. This is called gastroesophageal reflux disease (GERD). GERD can irritate the esophagus. It can cause problems with swallowing or breathing. In severe cases, GERD can cause recurrent pneumonia or other serious problems.  Symptoms of reflux include burning, pressure or sharp pain in the upper abdomen or mid to lower chest. The pain can spread to the neck, back, or shoulder. There may be belching, an acid taste in the back of the throat, chronic cough, or sore throat or hoarseness. GERD symptoms often occur during the day after a big meal. They can also occur at night when lying down.   Home care  Lifestyle changes can help reduce symptoms. If needed, medicines may be prescribed. Symptoms often improve with treatment, but if treatment is stopped, the symptoms often return after a few months. So most persons with GERD will need to continue treatment.  Lifestyle changes    Limit or avoid fatty, fried, and spicy foods, as well as coffee, chocolate, mint, and foods with  "high acid content such as tomatoes and citrus fruit and juices (orange, grapefruit, lemon).    Dont eat large meals, especially at night. Frequent, smaller meals are best. Do not lie down right after eating. And dont eat anything 3 hours before going to bed.    Avoid drinking alcohol and smoking. As much as possible, stay away from second hand smoke.    If you are overweight, losing weight will reduce symptoms.     Avoid wearing tight clothing around your stomach area.    If your symptoms occur during sleep, use a foam wedge to elevate your upper body (not just your head.) Or, place 4\" blocks under the head of your bed.  Medicines  If needed, medicines can help relieve the symptoms of GERD and prevent damage to the esophagus. Discuss a medicine plan with your healthcare provider. This may include one or more of the following medicines:    Antacids to help neutralize the normal acids in your stomach.    Acid blockers (H2 blockers) to decrease acid production.    Acid inhibitors (PPIs) to decrease acid production in a different way than the blockers. They may work better, but can take a little longer to take effect.  Take an antacid 30-60 minutes after eating and at bedtime, but not at the same time as an acid blocker.  Try not to take medicines such as ibuprofen and aspirin. If you are taking aspirin for your heart or other medical reasons, talk to your healthcare provider about stopping it.  Follow-up care  Follow up with your healthcare provider or as advised by our staff.  When to seek medical advice  Call your healthcare provider if any of the following occur:    Stomach pain gets worse or moves to the lower right abdomen (appendix area)    Chest pain appears or gets worse, or spreads to the back, neck, shoulder, or arm    Frequent vomiting (cant keep down liquids)    Blood in the stool or vomit (red or black in color)    Feeling weak or dizzy    Fever of 100.4 F (38 C) or higher, or as directed by your " healthcare provider  Date Last Reviewed: 6/23/2015 2000-2017 [employer]. [address] [place] [address], [place] [address]. All rights reserved. This information is not intended as a substitute for professional medical care. Always follow your healthcare professional's instructions.          Return in 3 days (on 11/23/2020) for follow-up with primary care. Call [phone]) or schedule an appointment via [employer]..    Subjective:   Sierra Pelaez is a 60 y.o. female who submitted an eVisit request for evaluation of her Heartburn.  See the questionnaire and message section of encounter report for information related to history of present illness and review of systems.    The following portions of the patient's history were reviewed and updated as appropriate:  She does not have any pertinent problems on file.  She is allergic to codeine and shrimp..     Objective:   No exam performed today, patient submitted as eVisit.

## 2021-07-29 ENCOUNTER — TRANSFERRED RECORDS (OUTPATIENT)
Dept: HEALTH INFORMATION MANAGEMENT | Facility: CLINIC | Age: 61
End: 2021-07-29

## 2021-09-26 DIAGNOSIS — E11.9 TYPE 2 DIABETES MELLITUS WITHOUT COMPLICATION, WITHOUT LONG-TERM CURRENT USE OF INSULIN (H): Primary | ICD-10-CM

## 2021-09-26 RX ORDER — GLIPIZIDE 5 MG/1
TABLET ORAL
Qty: 90 TABLET | Refills: 0 | Status: SHIPPED | OUTPATIENT
Start: 2021-09-26 | End: 2021-12-28

## 2021-09-26 NOTE — TELEPHONE ENCOUNTER
Please call patient.  Due for 6-month diabetic check.  90-day Rx sent to get in in the with me sometime this fall.    Elmer Emmanuel, CNP

## 2021-09-26 NOTE — TELEPHONE ENCOUNTER
glipiZIDE (GLUCOTROL) 5 MG tablet 90 tablet 0 6/23/2021  No   Sig: TAKE 1 TABLET BY MOUTH DAILY 1/2 HOUR BEFORE MEALS   Sent to pharmacy as: glipiZIDE 5 mg tablet (GLUCOTROL)   E-Prescribing Status: Receipt confirmed by pharmacy (6/23/2021  1:14 PM CDT)       glipiZIDE (GLUCOTROL) 5 MG tablet [[id number]]    Electronically signed by: Elmer Emmanuel CNP on 06/23/21 1314 Status: Active   Ordering user: Elmer Emmanuel CNP 06/23/21 1314 Authorized by: Elmer Emmanuel CNP   Frequency:  06/23/21 - Until Discontinued Released by: Elmer Emmanuel CNP 06/23/21 1314   Diagnoses  Type 2 diabetes mellitus without complication, without long-term current use of insulin (H) [E11.9]       Routing refill request to provider for review/approval because:  Labs not current: A1C, CR   Patient needs to be seen because:  Greater than 6 months since last OV    Last Written Prescription Date:  6/23/21  Last Fill Quantity: 90,  # refills: 0   Last office visit provider:  3/9/21     Requested Prescriptions   Pending Prescriptions Disp Refills     glipiZIDE (GLUCOTROL) 5 MG tablet [Pharmacy Med Name: GLIPIZIDE 5MG TABLETS] 90 tablet      Sig: TAKE 1 TABLET BY MOUTH DAILY 1/2 HOUR BEFORE MEALS       Sulfonylurea Agents Failed - 9/26/2021  9:02 AM        Failed - Patient has documented A1c within the specified period of time.     If HgbA1C is 8 or greater, it needs to be on file within the past 3 months.  If less than 8, must be on file within the past 6 months.     Recent Labs   Lab Test 09/02/20  0937   A1C 8.5*             Failed - Patient has a recent creatinine (normal) within the past 12 mos.     Recent Labs   Lab Test 09/02/20  0937   CR 0.77       Ok to refill medication if creatinine is low          Failed - Recent (6 mo) or future (30 days) visit within the authorizing provider's specialty     Patient had office visit in the last 6 months or has a visit in the next 30 days with authorizing provider or within the authorizing  "provider's specialty.  See \"Patient Info\" tab in inbasket, or \"Choose Columns\" in Meds & Orders section of the refill encounter.            Passed - Medication is active on med list        Passed - Patient is age 18 or older        Passed - No active pregnancy on record        Passed - Patient has not had a positive pregnancy test within the past 12 mos.             David York RN 09/26/21 1:26 PM  "

## 2021-10-06 ENCOUNTER — TELEPHONE (OUTPATIENT)
Dept: FAMILY MEDICINE | Facility: CLINIC | Age: 61
End: 2021-10-06

## 2021-10-16 ENCOUNTER — HEALTH MAINTENANCE LETTER (OUTPATIENT)
Age: 61
End: 2021-10-16

## 2021-11-29 ENCOUNTER — TRANSFERRED RECORDS (OUTPATIENT)
Dept: HEALTH INFORMATION MANAGEMENT | Facility: CLINIC | Age: 61
End: 2021-11-29
Payer: COMMERCIAL

## 2022-01-20 ENCOUNTER — HOSPITAL ENCOUNTER (INPATIENT)
Facility: CLINIC | Age: 62
LOS: 2 days | Discharge: HOME | End: 2022-01-23
Attending: EMERGENCY MEDICINE | Admitting: HOSPITALIST
Payer: COMMERCIAL

## 2022-01-20 ENCOUNTER — OFFICE VISIT (OUTPATIENT)
Dept: FAMILY MEDICINE | Facility: CLINIC | Age: 62
End: 2022-01-20
Payer: COMMERCIAL

## 2022-01-20 ENCOUNTER — APPOINTMENT (OUTPATIENT)
Dept: CT IMAGING | Facility: CLINIC | Age: 62
End: 2022-01-20
Attending: EMERGENCY MEDICINE
Payer: COMMERCIAL

## 2022-01-20 VITALS
WEIGHT: 129.2 LBS | DIASTOLIC BLOOD PRESSURE: 88 MMHG | BODY MASS INDEX: 27 KG/M2 | HEART RATE: 87 BPM | OXYGEN SATURATION: 99 % | SYSTOLIC BLOOD PRESSURE: 130 MMHG

## 2022-01-20 DIAGNOSIS — I10 BENIGN ESSENTIAL HYPERTENSION: ICD-10-CM

## 2022-01-20 DIAGNOSIS — K52.9 ILEITIS: ICD-10-CM

## 2022-01-20 DIAGNOSIS — E11.9 TYPE 2 DIABETES MELLITUS WITHOUT COMPLICATION, WITHOUT LONG-TERM CURRENT USE OF INSULIN (H): Primary | ICD-10-CM

## 2022-01-20 DIAGNOSIS — I10 BENIGN ESSENTIAL HYPERTENSION: Primary | ICD-10-CM

## 2022-01-20 DIAGNOSIS — C50.919 MALIGNANT NEOPLASM OF FEMALE BREAST, UNSPECIFIED ESTROGEN RECEPTOR STATUS, UNSPECIFIED LATERALITY, UNSPECIFIED SITE OF BREAST (H): ICD-10-CM

## 2022-01-20 DIAGNOSIS — Z13.220 LIPID SCREENING: ICD-10-CM

## 2022-01-20 DIAGNOSIS — R05.9 COUGH: ICD-10-CM

## 2022-01-20 DIAGNOSIS — K56.609 SMALL BOWEL OBSTRUCTION (H): ICD-10-CM

## 2022-01-20 PROBLEM — M85.80 OSTEOPENIA: Status: ACTIVE | Noted: 2019-07-19

## 2022-01-20 LAB
ALBUMIN SERPL-MCNC: 4 G/DL (ref 3.5–5)
ALBUMIN SERPL-MCNC: 4.1 G/DL (ref 3.5–5)
ALP SERPL-CCNC: 104 U/L (ref 45–120)
ALP SERPL-CCNC: 106 U/L (ref 45–120)
ALT SERPL W P-5'-P-CCNC: 24 U/L (ref 0–45)
ALT SERPL W P-5'-P-CCNC: 26 U/L (ref 0–45)
ANION GAP SERPL CALCULATED.3IONS-SCNC: 14 MMOL/L (ref 5–18)
ANION GAP SERPL CALCULATED.3IONS-SCNC: 14 MMOL/L (ref 5–18)
AST SERPL W P-5'-P-CCNC: 20 U/L (ref 0–40)
AST SERPL W P-5'-P-CCNC: 21 U/L (ref 0–40)
BASOPHILS # BLD AUTO: 0 10E3/UL (ref 0–0.2)
BASOPHILS NFR BLD AUTO: 0 %
BILIRUB DIRECT SERPL-MCNC: 0.3 MG/DL
BILIRUB SERPL-MCNC: 0.7 MG/DL (ref 0–1)
BILIRUB SERPL-MCNC: 0.7 MG/DL (ref 0–1)
BUN SERPL-MCNC: 13 MG/DL (ref 8–22)
BUN SERPL-MCNC: 15 MG/DL (ref 8–22)
CALCIUM SERPL-MCNC: 10.2 MG/DL (ref 8.5–10.5)
CALCIUM SERPL-MCNC: 10.5 MG/DL (ref 8.5–10.5)
CHLORIDE BLD-SCNC: 102 MMOL/L (ref 98–107)
CHLORIDE BLD-SCNC: 104 MMOL/L (ref 98–107)
CHOLEST SERPL-MCNC: 139 MG/DL
CO2 SERPL-SCNC: 21 MMOL/L (ref 22–31)
CO2 SERPL-SCNC: 24 MMOL/L (ref 22–31)
CREAT SERPL-MCNC: 0.8 MG/DL (ref 0.6–1.1)
CREAT SERPL-MCNC: 0.82 MG/DL (ref 0.6–1.1)
CREAT UR-MCNC: 146 MG/DL
EOSINOPHIL # BLD AUTO: 0 10E3/UL (ref 0–0.7)
EOSINOPHIL NFR BLD AUTO: 0 %
ERYTHROCYTE [DISTWIDTH] IN BLOOD BY AUTOMATED COUNT: 13.9 % (ref 10–15)
FASTING STATUS PATIENT QL REPORTED: YES
GFR SERPL CREATININE-BSD FRML MDRD: 80 ML/MIN/1.73M2
GFR SERPL CREATININE-BSD FRML MDRD: 82 ML/MIN/1.73M2
GLUCOSE BLD-MCNC: 211 MG/DL (ref 70–125)
GLUCOSE BLD-MCNC: 238 MG/DL (ref 70–125)
HCT VFR BLD AUTO: 45.4 % (ref 35–47)
HDLC SERPL-MCNC: 50 MG/DL
HGB BLD-MCNC: 14.6 G/DL (ref 11.7–15.7)
IMM GRANULOCYTES # BLD: 0 10E3/UL
IMM GRANULOCYTES NFR BLD: 0 %
LDLC SERPL CALC-MCNC: 75 MG/DL
LIPASE SERPL-CCNC: 13 U/L (ref 0–52)
LYMPHOCYTES # BLD AUTO: 0.5 10E3/UL (ref 0.8–5.3)
LYMPHOCYTES NFR BLD AUTO: 5 %
MCH RBC QN AUTO: 25.9 PG (ref 26.5–33)
MCHC RBC AUTO-ENTMCNC: 32.2 G/DL (ref 31.5–36.5)
MCV RBC AUTO: 81 FL (ref 78–100)
MICROALBUMIN UR-MCNC: 9.67 MG/DL (ref 0–1.99)
MICROALBUMIN/CREAT UR: 66.2 MG/G CR
MONOCYTES # BLD AUTO: 0.1 10E3/UL (ref 0–1.3)
MONOCYTES NFR BLD AUTO: 1 %
NEUTROPHILS # BLD AUTO: 8.7 10E3/UL (ref 1.6–8.3)
NEUTROPHILS NFR BLD AUTO: 94 %
NRBC # BLD AUTO: 0 10E3/UL
NRBC BLD AUTO-RTO: 0 /100
PLATELET # BLD AUTO: 322 10E3/UL (ref 150–450)
POTASSIUM BLD-SCNC: 3.9 MMOL/L (ref 3.5–5)
POTASSIUM BLD-SCNC: 4.1 MMOL/L (ref 3.5–5)
PROT SERPL-MCNC: 7.5 G/DL (ref 6–8)
PROT SERPL-MCNC: 8.1 G/DL (ref 6–8)
RBC # BLD AUTO: 5.64 10E6/UL (ref 3.8–5.2)
SODIUM SERPL-SCNC: 139 MMOL/L (ref 136–145)
SODIUM SERPL-SCNC: 140 MMOL/L (ref 136–145)
TRIGL SERPL-MCNC: 72 MG/DL
WBC # BLD AUTO: 9.3 10E3/UL (ref 4–11)

## 2022-01-20 PROCEDURE — 82043 UR ALBUMIN QUANTITATIVE: CPT | Performed by: NURSE PRACTITIONER

## 2022-01-20 PROCEDURE — 74177 CT ABD & PELVIS W/CONTRAST: CPT

## 2022-01-20 PROCEDURE — 96374 THER/PROPH/DIAG INJ IV PUSH: CPT | Mod: 59

## 2022-01-20 PROCEDURE — 99214 OFFICE O/P EST MOD 30 MIN: CPT | Performed by: NURSE PRACTITIONER

## 2022-01-20 PROCEDURE — 84450 TRANSFERASE (AST) (SGOT): CPT | Performed by: EMERGENCY MEDICINE

## 2022-01-20 PROCEDURE — 36415 COLL VENOUS BLD VENIPUNCTURE: CPT | Performed by: EMERGENCY MEDICINE

## 2022-01-20 PROCEDURE — 250N000011 HC RX IP 250 OP 636: Performed by: EMERGENCY MEDICINE

## 2022-01-20 PROCEDURE — 80053 COMPREHEN METABOLIC PANEL: CPT | Performed by: NURSE PRACTITIONER

## 2022-01-20 PROCEDURE — 83690 ASSAY OF LIPASE: CPT | Performed by: EMERGENCY MEDICINE

## 2022-01-20 PROCEDURE — 36415 COLL VENOUS BLD VENIPUNCTURE: CPT | Performed by: NURSE PRACTITIONER

## 2022-01-20 PROCEDURE — 83036 HEMOGLOBIN GLYCOSYLATED A1C: CPT | Performed by: NURSE PRACTITIONER

## 2022-01-20 PROCEDURE — 80061 LIPID PANEL: CPT | Performed by: NURSE PRACTITIONER

## 2022-01-20 PROCEDURE — 99285 EMERGENCY DEPT VISIT HI MDM: CPT | Mod: 25

## 2022-01-20 PROCEDURE — 85004 AUTOMATED DIFF WBC COUNT: CPT | Performed by: EMERGENCY MEDICINE

## 2022-01-20 RX ORDER — ALBUTEROL SULFATE 90 UG/1
2 AEROSOL, METERED RESPIRATORY (INHALATION) EVERY 6 HOURS
Qty: 18 G | Refills: 0 | Status: SHIPPED | OUTPATIENT
Start: 2022-01-20 | End: 2022-02-15

## 2022-01-20 RX ORDER — LISINOPRIL 30 MG/1
TABLET ORAL
Qty: 90 TABLET | Refills: 1 | Status: ON HOLD | OUTPATIENT
Start: 2022-01-20 | End: 2022-01-23

## 2022-01-20 RX ORDER — GLIPIZIDE 5 MG/1
5 TABLET ORAL DAILY
Qty: 90 TABLET | Refills: 1 | Status: SHIPPED | OUTPATIENT
Start: 2022-01-20

## 2022-01-20 RX ORDER — IOPAMIDOL 755 MG/ML
100 INJECTION, SOLUTION INTRAVASCULAR ONCE
Status: COMPLETED | OUTPATIENT
Start: 2022-01-20 | End: 2022-01-20

## 2022-01-20 RX ORDER — ONDANSETRON 2 MG/ML
4 INJECTION INTRAMUSCULAR; INTRAVENOUS ONCE
Status: COMPLETED | OUTPATIENT
Start: 2022-01-20 | End: 2022-01-20

## 2022-01-20 RX ORDER — IOPAMIDOL 755 MG/ML
100 INJECTION, SOLUTION INTRAVASCULAR ONCE
Status: DISCONTINUED | OUTPATIENT
Start: 2022-01-20 | End: 2022-01-20

## 2022-01-20 RX ORDER — ATORVASTATIN CALCIUM 20 MG/1
20 TABLET, FILM COATED ORAL DAILY
Qty: 90 TABLET | Refills: 1 | Status: SHIPPED | OUTPATIENT
Start: 2022-01-20

## 2022-01-20 RX ADMIN — IOPAMIDOL 100 ML: 755 INJECTION, SOLUTION INTRAVENOUS at 23:33

## 2022-01-20 RX ADMIN — ONDANSETRON 4 MG: 2 INJECTION INTRAMUSCULAR; INTRAVENOUS at 22:53

## 2022-01-20 NOTE — LETTER
January 23, 2022      Sierra Pelaez  [address] [facility] [address]        Dear ,    We are writing to inform you of your test results.    Cholesterol levels look good along with kidney function, liver enzymes, and electrolytes.  A1c is better than what it has been in the past, but not at goal.  I would go ahead and just take that glipizide at least every day with a meal along with an evening dose with dinner as well to help bring this under better control.    Resulted Orders   Albumin Random Urine Quantitative with Creat Ratio   Result Value Ref Range    Microalbumin Urine mg/dL 9.67 (H) 0.00 - 1.99 mg/dL    Creatinine Urine mg/dL 146 mg/dL    Microalbumin Urine mg/g Cr 66.2 (H) <=19.9 mg/g Cr    Narrative    Microalbumin, Random Urine   <2.0 mg/dL . . . . . . . . Normal   3.0-30.0 mg/dL . . . . . . Microalbuminuria   >30.0 mg/dL . . . . . .  . Clinical Proteinuria     Microalbumin/Creatinine Ratio, Random Urine   <20 mg/g . . . . .. . . . Normal    mg/g . . . . . . . Microalbuminuria   >300 mg/g . . . . . . . . Clinical Proteinuria   Lipid panel   Result Value Ref Range    Cholesterol 139 <=199 mg/dL    Triglycerides 72 <=149 mg/dL    Direct Measure HDL 50 >=50 mg/dL      Comment:      HDL Cholesterol Reference Range:     0-2 years:   No reference ranges established for patients under 2 years old  at [facility] for lipid analytes.    2-8 years:  Greater than 45 mg/dL     18 years and older:   Female: Greater than or equal to 50 mg/dL   Male:   Greater than or equal to 40 mg/dL    LDL Cholesterol Calculated 75 <=129 mg/dL    Patient Fasting > 8hrs? Yes    Comprehensive metabolic panel (BMP + Alb, Alk Phos, ALT, AST, Total. Bili, TP)   Result Value Ref Range    Sodium 139 136 - 145 mmol/L    Potassium 4.1 3.5 - 5.0 mmol/L    Chloride 104 98 - 107 mmol/L    Carbon Dioxide (CO2) 21 (L) 22 - 31 mmol/L    Anion Gap 14 5 - 18 mmol/L    Urea Nitrogen 13 8 - 22 mg/dL    Creatinine 0.80  0.60 - 1.10 mg/dL    Calcium 10.2 8.5 - 10.5 mg/dL    Glucose 211 (H) 70 - 125 mg/dL    Alkaline Phosphatase 106 45 - 120 U/L    AST 20 0 - 40 U/L    ALT 24 0 - 45 U/L    Protein Total 7.5 6.0 - 8.0 g/dL    Albumin 4.0 3.5 - 5.0 g/dL    Bilirubin Total 0.7 0.0 - 1.0 mg/dL    GFR Estimate 82 >60 mL/min/1.73m2      Comment:      Effective December 21, 2021 eGFRcr in adults is calculated using the 2021 CKD-EPI creatinine equation which includes age and gender (Marta et al., NEJM, DOI: 10.1056/ZKFJch0030732)   Hemoglobin A1c   Result Value Ref Range    Hemoglobin A1C 7.9 (H) <=5.6 %      Comment:        Prediabetes: 5.7 to 6.4%        Diabetes:  >=6.5%     Patients with Hgb F >5%, total bilirubin >10.0 mg/dL, abnormal red cell turnover, severe renal or hepatic disease or malignancy should not have this A1C method used to diagnose or monitor diabetes.        If you have any questions or concerns, please call the clinic at the number listed above.       Sincerely,      Elmer Emmanuel NP

## 2022-01-20 NOTE — PATIENT INSTRUCTIONS
Refills of meds and testing supplies sent to the pharmacy.    I also sent a new albuterol inhaler.    Updating labs today.

## 2022-01-20 NOTE — PROGRESS NOTES
Chief Complaint   Patient presents with     Diabetes     pt is fasting. refills needed for travel.        HPI: Patient presents today for medication check. Known history of diabetes. She spot checks her sugars periodically and they are consistently below 150. No hypoglycemic events. Continues to follow with oncology for management of breast cancer and will be going to [facility] to see called naturopathic options in conjunction with her current treatment plan. She would like a refill of her albuterol to have on hand in case she develops any coughing episodes. Her last refill was about 5 years ago and has since     ROS:Review of Systems - negative except for what's listed in the HPI    SH: The Patient's  reports that she has never smoked. She has never used smokeless tobacco. She reports previous alcohol use. She reports that she does not use drugs.      FH: The Patient's family history includes Breast Cancer in her sister; Cancer in her mother; Hyperlipidemia in her brother; Hypertension in her mother; Lymphoma in her sister; Transient ischemic attack in her father.     Meds:    Current Outpatient Medications   Medication     albuterol (PROAIR HFA/PROVENTIL HFA/VENTOLIN HFA) 108 (90 Base) MCG/ACT inhaler     atorvastatin (LIPITOR) 20 MG tablet     blood glucose (NO BRAND SPECIFIED) lancets standard     blood glucose (NO BRAND SPECIFIED) test strip     exemestane (AROMASIN) 25 mg tablet     glipiZIDE (GLUCOTROL) 5 MG tablet     lisinopril (ZESTRIL) 30 MG tablet     blood glucose meter (GLUCOMETER)     blood glucose test strips     blood pressure monitor Kit     generic lancets (FINGERSTIX LANCETS)     ibuprofen (ADVIL,MOTRIN) 600 MG tablet     No current facility-administered medications for this visit.       O:  /88   Pulse 87   Wt 58.6 kg (129 lb 3.2 oz)   SpO2 99%   Breastfeeding No   BMI 27.00 kg/m      Physical Examination:   General appearance - alert, well appearing, and in no distress  Mental  status - alert, oriented to person, place, and time  Eyes -PERRLA  Chest - clear to auscultation, no wheezes, rales or rhonchi, symmetric air entry  Heart - normal rate and regular rhythm, S1 and S2 normal, no murmurs noted  Abdomen - soft, nontender, nondistended, no masses or organomegaly  Neurological - alert, oriented, normal speech, no focal findings or movement disorder noted, neck supple without rigidity, cranial nerves II through XII intact, motor and sensory grossly normal bilaterally, normal muscle tone, no tremors, strength 5/5, normal sensation with monofilament probing along the plantar surface of the feet  Extremities - peripheral pulses normal, no peripheral edema  Skin - normal coloration and turgor.      A/P:       ICD-10-CM    1. Cough  R05.9 albuterol (PROAIR HFA/PROVENTIL HFA/VENTOLIN HFA) 108 (90 Base) MCG/ACT inhaler   2. Type 2 diabetes mellitus without complication, without long-term current use of insulin (H)  E11.9 glipiZIDE (GLUCOTROL) 5 MG tablet     atorvastatin (LIPITOR) 20 MG tablet     blood glucose (NO BRAND SPECIFIED) lancets standard     blood glucose (NO BRAND SPECIFIED) test strip     Albumin Random Urine Quantitative with Creat Ratio     Comprehensive metabolic panel (BMP + Alb, Alk Phos, ALT, AST, Total. Bili, TP)     Albumin Random Urine Quantitative with Creat Ratio     Comprehensive metabolic panel (BMP + Alb, Alk Phos, ALT, AST, Total. Bili, TP)   3. Benign essential hypertension  I10 lisinopril (ZESTRIL) 30 MG tablet     Comprehensive metabolic panel (BMP + Alb, Alk Phos, ALT, AST, Total. Bili, TP)     Comprehensive metabolic panel (BMP + Alb, Alk Phos, ALT, AST, Total. Bili, TP)   4. Lipid screening  Z13.220 Lipid panel     Lipid panel     Update labs. Refill sent to the pharmacy. Okay to have albuterol to hold onto. Adjust diabetic medications based on results. We will get copy upfront for patient to  tomorrow to take overseas with her.    Reviewed office visit  note from oncology x2, oncology CBC x1, BMP x1.    Type 2 diabetes mellitus without complication, without long-term current use of insulin (H)  - glipiZIDE (GLUCOTROL) 5 MG tablet  Dispense: 90 tablet; Refill: 1  - atorvastatin (LIPITOR) 20 MG tablet  Dispense: 90 tablet; Refill: 1  - blood glucose (NO BRAND SPECIFIED) lancets standard  Dispense: 100 each; Refill: 0  - blood glucose (NO BRAND SPECIFIED) test strip  Dispense: 100 strip; Refill: 0  - Albumin Random Urine Quantitative with Creat Ratio  - Comprehensive metabolic panel (BMP + Alb, Alk Phos, ALT, AST, Total. Bili, TP)  - Albumin Random Urine Quantitative with Creat Ratio  - Comprehensive metabolic panel (BMP + Alb, Alk Phos, ALT, AST, Total. Bili, TP)    Benign essential hypertension  - lisinopril (ZESTRIL) 30 MG tablet  Dispense: 90 tablet; Refill: 1  - Comprehensive metabolic panel (BMP + Alb, Alk Phos, ALT, AST, Total. Bili, TP)  - Comprehensive metabolic panel (BMP + Alb, Alk Phos, ALT, AST, Total. Bili, TP)    Cough  - albuterol (PROAIR HFA/PROVENTIL HFA/VENTOLIN HFA) 108 (90 Base) MCG/ACT inhaler  Dispense: 18 g; Refill: 0    Lipid screening  - Lipid panel  - Lipid panel        Elmer Emmanuel, CNP      This note has been dictated using voice recognition software. Any grammatical or context distortions are unintentional and inherent to the software.

## 2022-01-21 PROBLEM — K56.609 SMALL BOWEL OBSTRUCTION (H): Status: ACTIVE | Noted: 2022-01-21

## 2022-01-21 LAB
GLUCOSE BLDC GLUCOMTR-MCNC: 128 MG/DL (ref 70–99)
GLUCOSE BLDC GLUCOMTR-MCNC: 137 MG/DL (ref 70–99)
GLUCOSE BLDC GLUCOMTR-MCNC: 137 MG/DL (ref 70–99)
GLUCOSE BLDC GLUCOMTR-MCNC: 203 MG/DL (ref 70–99)
GLUCOSE BLDC GLUCOMTR-MCNC: 217 MG/DL (ref 70–99)
INR PPP: 1.12 (ref 0.85–1.15)
LACTATE SERPL-SCNC: 1.8 MMOL/L (ref 0.7–2)
SARS-COV-2 RNA RESP QL NAA+PROBE: NEGATIVE

## 2022-01-21 PROCEDURE — 250N000011 HC RX IP 250 OP 636: Performed by: HOSPITALIST

## 2022-01-21 PROCEDURE — 87635 SARS-COV-2 COVID-19 AMP PRB: CPT | Performed by: EMERGENCY MEDICINE

## 2022-01-21 PROCEDURE — 258N000003 HC RX IP 258 OP 636: Performed by: EMERGENCY MEDICINE

## 2022-01-21 PROCEDURE — 250N000009 HC RX 250: Performed by: INTERNAL MEDICINE

## 2022-01-21 PROCEDURE — C9803 HOPD COVID-19 SPEC COLLECT: HCPCS

## 2022-01-21 PROCEDURE — 120N000001 HC R&B MED SURG/OB

## 2022-01-21 PROCEDURE — 83605 ASSAY OF LACTIC ACID: CPT | Performed by: EMERGENCY MEDICINE

## 2022-01-21 PROCEDURE — 96361 HYDRATE IV INFUSION ADD-ON: CPT

## 2022-01-21 PROCEDURE — 96375 TX/PRO/DX INJ NEW DRUG ADDON: CPT

## 2022-01-21 PROCEDURE — 258N000003 HC RX IP 258 OP 636: Performed by: HOSPITALIST

## 2022-01-21 PROCEDURE — 250N000011 HC RX IP 250 OP 636: Performed by: EMERGENCY MEDICINE

## 2022-01-21 PROCEDURE — 96376 TX/PRO/DX INJ SAME DRUG ADON: CPT

## 2022-01-21 PROCEDURE — 99222 1ST HOSP IP/OBS MODERATE 55: CPT | Performed by: SPECIALIST

## 2022-01-21 PROCEDURE — 250N000011 HC RX IP 250 OP 636: Performed by: STUDENT IN AN ORGANIZED HEALTH CARE EDUCATION/TRAINING PROGRAM

## 2022-01-21 PROCEDURE — 36415 COLL VENOUS BLD VENIPUNCTURE: CPT | Performed by: EMERGENCY MEDICINE

## 2022-01-21 PROCEDURE — 85610 PROTHROMBIN TIME: CPT | Performed by: EMERGENCY MEDICINE

## 2022-01-21 PROCEDURE — 99223 1ST HOSP IP/OBS HIGH 75: CPT | Mod: AI | Performed by: HOSPITALIST

## 2022-01-21 RX ORDER — ONDANSETRON 2 MG/ML
4 INJECTION INTRAMUSCULAR; INTRAVENOUS ONCE
Status: COMPLETED | OUTPATIENT
Start: 2022-01-21 | End: 2022-01-21

## 2022-01-21 RX ORDER — EVEROLIMUS 10 MG/1
10 TABLET ORAL DAILY
COMMUNITY

## 2022-01-21 RX ORDER — HYDRALAZINE HYDROCHLORIDE 20 MG/ML
10 INJECTION INTRAMUSCULAR; INTRAVENOUS EVERY 6 HOURS PRN
Status: DISCONTINUED | OUTPATIENT
Start: 2022-01-21 | End: 2022-01-23 | Stop reason: HOSPADM

## 2022-01-21 RX ORDER — SODIUM CHLORIDE, SODIUM LACTATE, POTASSIUM CHLORIDE, CALCIUM CHLORIDE 600; 310; 30; 20 MG/100ML; MG/100ML; MG/100ML; MG/100ML
INJECTION, SOLUTION INTRAVENOUS CONTINUOUS
Status: DISCONTINUED | OUTPATIENT
Start: 2022-01-21 | End: 2022-01-23

## 2022-01-21 RX ORDER — ONDANSETRON 4 MG/1
4 TABLET, ORALLY DISINTEGRATING ORAL EVERY 6 HOURS PRN
Status: DISCONTINUED | OUTPATIENT
Start: 2022-01-21 | End: 2022-01-23 | Stop reason: HOSPADM

## 2022-01-21 RX ORDER — ENALAPRILAT 1.25 MG/ML
1.25 INJECTION INTRAVENOUS EVERY 6 HOURS
Status: DISCONTINUED | OUTPATIENT
Start: 2022-01-21 | End: 2022-01-22

## 2022-01-21 RX ORDER — LIDOCAINE 40 MG/G
CREAM TOPICAL
Status: DISCONTINUED | OUTPATIENT
Start: 2022-01-21 | End: 2022-01-23 | Stop reason: HOSPADM

## 2022-01-21 RX ORDER — ONDANSETRON 2 MG/ML
4 INJECTION INTRAMUSCULAR; INTRAVENOUS EVERY 6 HOURS PRN
Status: DISCONTINUED | OUTPATIENT
Start: 2022-01-21 | End: 2022-01-23 | Stop reason: HOSPADM

## 2022-01-21 RX ORDER — DEXTROSE MONOHYDRATE 25 G/50ML
25-50 INJECTION, SOLUTION INTRAVENOUS
Status: DISCONTINUED | OUTPATIENT
Start: 2022-01-21 | End: 2022-01-23 | Stop reason: HOSPADM

## 2022-01-21 RX ORDER — MORPHINE SULFATE 2 MG/ML
2 INJECTION, SOLUTION INTRAMUSCULAR; INTRAVENOUS
Status: DISCONTINUED | OUTPATIENT
Start: 2022-01-21 | End: 2022-01-23 | Stop reason: HOSPADM

## 2022-01-21 RX ORDER — ALBUTEROL SULFATE 90 UG/1
2 AEROSOL, METERED RESPIRATORY (INHALATION) EVERY 6 HOURS PRN
Status: DISCONTINUED | OUTPATIENT
Start: 2022-01-21 | End: 2022-01-23 | Stop reason: HOSPADM

## 2022-01-21 RX ORDER — HYDROMORPHONE HCL IN WATER/PF 6 MG/30 ML
.1-.2 PATIENT CONTROLLED ANALGESIA SYRINGE INTRAVENOUS
Status: DISCONTINUED | OUTPATIENT
Start: 2022-01-21 | End: 2022-01-22

## 2022-01-21 RX ORDER — NICOTINE POLACRILEX 4 MG
15-30 LOZENGE BUCCAL
Status: DISCONTINUED | OUTPATIENT
Start: 2022-01-21 | End: 2022-01-23 | Stop reason: HOSPADM

## 2022-01-21 RX ORDER — ACETAMINOPHEN 10 MG/ML
1000 INJECTION, SOLUTION INTRAVENOUS ONCE
Status: COMPLETED | OUTPATIENT
Start: 2022-01-21 | End: 2022-01-21

## 2022-01-21 RX ADMIN — ENALAPRILAT 1.25 MG: 2.5 INJECTION INTRAVENOUS at 20:32

## 2022-01-21 RX ADMIN — ONDANSETRON 4 MG: 2 INJECTION INTRAMUSCULAR; INTRAVENOUS at 01:10

## 2022-01-21 RX ADMIN — SODIUM CHLORIDE, POTASSIUM CHLORIDE, SODIUM LACTATE AND CALCIUM CHLORIDE: 600; 310; 30; 20 INJECTION, SOLUTION INTRAVENOUS at 13:13

## 2022-01-21 RX ADMIN — SODIUM CHLORIDE, POTASSIUM CHLORIDE, SODIUM LACTATE AND CALCIUM CHLORIDE: 600; 310; 30; 20 INJECTION, SOLUTION INTRAVENOUS at 03:52

## 2022-01-21 RX ADMIN — MORPHINE SULFATE 2 MG: 2 INJECTION, SOLUTION INTRAMUSCULAR; INTRAVENOUS at 03:51

## 2022-01-21 RX ADMIN — ACETAMINOPHEN 1000 MG: 10 INJECTION, SOLUTION INTRAVENOUS at 16:16

## 2022-01-21 RX ADMIN — SODIUM CHLORIDE, POTASSIUM CHLORIDE, SODIUM LACTATE AND CALCIUM CHLORIDE 1000 ML: 600; 310; 30; 20 INJECTION, SOLUTION INTRAVENOUS at 02:30

## 2022-01-21 RX ADMIN — HYDRALAZINE HYDROCHLORIDE 10 MG: 20 INJECTION INTRAMUSCULAR; INTRAVENOUS at 03:21

## 2022-01-21 RX ADMIN — SODIUM CHLORIDE 1000 ML: 9 INJECTION, SOLUTION INTRAVENOUS at 00:15

## 2022-01-21 ASSESSMENT — ACTIVITIES OF DAILY LIVING (ADL)
ADLS_ACUITY_SCORE: 10
ADLS_ACUITY_SCORE: 7
ADLS_ACUITY_SCORE: 10
ADLS_ACUITY_SCORE: 9
ADLS_ACUITY_SCORE: 9
ADLS_ACUITY_SCORE: 7
ADLS_ACUITY_SCORE: 9
ADLS_ACUITY_SCORE: 10
ADLS_ACUITY_SCORE: 7
ADLS_ACUITY_SCORE: 7
ADLS_ACUITY_SCORE: 9
ADLS_ACUITY_SCORE: 10
DEPENDENT_IADLS:: TRANSPORTATION
ADLS_ACUITY_SCORE: 7

## 2022-01-21 NOTE — CONSULTS
[facility] Surgery Consultation    Sierra Pelaez MRN# [id number]   Age: 61 year old YOB: 1960     Date of Admission:  1/20/2022    Reason for consult: Abdominal pain  nausea and vomiting       Requesting physician: Dr. Abebe       Level of consult: Consult, follow and place orders           Assessment and Plan:   Assessment:   Proximal small bowel obstruction  With her history of no surgery and abrupt resolution of symptoms I guess is probably some type of enteritis proximal with the swelling and the issues will evaluate this tomorrow with an upper follow-through  Appears to be resolving,  No pain  Ng inplace      Plan:   NG  Decompress and let edema improve  Gastrogaffin upper gi later today or tomorrow.              Chief Complaint:   Abdominal pain, epigastric  Nausea and vomiting     History is obtained from the patient         History of Present Illness:   This patient is a 61 year old  female without a significant past medical history who presents with abdominal pain that started yesterday around noon she counted developed this pain and suddenly then began vomiting and nausea and this has unrelenting counter for she went to the emergency room for evaluation and get a CT scan of her abdomen shows a proximal dilated bowel right past ligament of Treitz goes over to the right abdomen no swirling or volvulus or closed-loop really but just cannot abruptly and edema of the small bowel.  NG was placed she feels remarkably better she is not passing gas had had a normal bowel movement yesterday and feels great and wants to go home.             Past Medical History:   I have reviewed this patient's past medical history          Past Surgical History:     Past Surgical History:   Procedure Laterality Date     MASTECTOMY Bilateral 02/01/2016             Social History:     Social History     Tobacco Use     Smoking status: Never Smoker     Smokeless tobacco: Never Used   Substance Use Topics     Alcohol  use: Not Currently             Family History:     Family History   Problem Relation Age of Onset     Cancer Mother         bladder or uterine     Hypertension Mother      Transient ischemic attack Father      Breast Cancer Sister      Hyperlipidemia Brother      Lymphoma Sister      Family history reviewed and updated in EPIC          Immunizations:   Immunization status is unknown          Allergies:     All allergies reviewed and addressed  Allergies   Allergen Reactions     Codeine Hives     Family is allergic to codeine so pt wants to stay away from it.      Metformin Diarrhea     Shrimp Itching     Shrimp [Shrimp Extract Allergy Skin Test] Itching     Shellfish Containing Products [Shellfish-Derived Products] Rash             Medications:     Current Facility-Administered Medications   Medication     albuterol (PROVENTIL HFA/VENTOLIN HFA) inhaler     benzocaine 20% (HURRICAINE/TOPEX) 20 % spray 0.5-1 mL     glucose gel 15-30 g    Or     dextrose 50 % injection 25-50 mL    Or     glucagon injection 1 mg     hydrALAZINE (APRESOLINE) injection 10 mg     insulin aspart (NovoLOG) injection (RAPID ACTING)     insulin aspart (NovoLOG) injection (RAPID ACTING)     lactated ringers infusion     lidocaine (LMX4) cream     lidocaine 1 % 0.1-1 mL     melatonin tablet 1 mg     morphine (PF) injection 2 mg     ondansetron (ZOFRAN-ODT) ODT tab 4 mg    Or     ondansetron (ZOFRAN) injection 4 mg     sodium chloride (PF) 0.9% PF flush 3 mL     sodium chloride (PF) 0.9% PF flush 3 mL     Current Outpatient Medications   Medication Sig     albuterol (PROAIR HFA/PROVENTIL HFA/VENTOLIN HFA) 108 (90 Base) MCG/ACT inhaler Inhale 2 puffs into the lungs every 6 hours (Patient taking differently: Inhale 2 puffs into the lungs every 6 hours as needed )     atorvastatin (LIPITOR) 20 MG tablet Take 1 tablet (20 mg) by mouth daily     everolimus (AFINITOR) 10 MG tablet Take 10 mg by mouth daily     exemestane (AROMASIN) 25 mg tablet Take 25  mg by mouth daily      glipiZIDE (GLUCOTROL) 5 MG tablet Take 1 tablet (5 mg) by mouth daily     ibuprofen (ADVIL,MOTRIN) 600 MG tablet Take 600 mg by mouth every 6 hours as needed      lisinopril (ZESTRIL) 30 MG tablet [LISINOPRIL (PRINIVIL,ZESTRIL) 30 MG TABLET] TAKE 1 TABLET(30 MG) BY MOUTH DAILY     blood glucose (NO BRAND SPECIFIED) lancets standard Use to test blood sugar 1 times daily or as directed. Dispense brand per insurance preference     blood glucose (NO BRAND SPECIFIED) test strip Use to test blood sugar 1 times daily or as directed. Ok to dispense per insurance preference     blood glucose meter (GLUCOMETER) [BLOOD GLUCOSE METER (GLUCOMETER)] Use 1 each As Directed as needed. Dispense glucometer, needle, and strip brand per patient's insurance at pharmacy discretion.     blood glucose test strips [BLOOD GLUCOSE TEST STRIPS] Test once daily. Dispense brand per patient's insurance at pharmacy discretion.     blood pressure monitor Kit [BLOOD PRESSURE MONITOR KIT] Use 1 kit As Directed daily.     generic lancets (FINGERSTIX LANCETS) [GENERIC LANCETS (FINGERSTIX LANCETS)] Test once daily. Dispense brand per patient's insurance at pharmacy discretion.             Review of Systems:   The Review of Systems is negative other than noted in the HPI          Physical Exam:     Vitals were reviewed  Temp: 98.2  F (36.8  C) Temp src: Temporal BP: (!) 144/73 Pulse: 107   Resp: 16 SpO2: 95 % O2 Device: None (Room air)    Wt Readings from Last 4 Encounters:   01/20/22 58.5 kg (129 lb)   01/20/22 58.6 kg (129 lb 3.2 oz)   03/09/21 53.1 kg (117 lb)   12/08/20 53.1 kg (117 lb)     I/O last 3 completed shifts:  In: 1000 [IV Piggyback:1000]  Out: 750 [Emesis/NG output:750]  Constitutional:   awake, alert, cooperative, no apparent distress, and appears stated age     Eyes:   Lids and lashes normal, pupils equal, round and reactive to light, extra ocular muscles intact, sclera clear, conjunctiva normal     ENT:    Normocephalic, without obvious abnormality, atraumatic, sinuses nontender on palpation, external ears without lesions, oral pharynx with moist mucous membranes, tonsils without erythema or exudates, gums normal and good dentition.     Neck:   Supple, symmetrical, trachea midline, no adenopathy, thyroid symmetric, not enlarged and no tenderness, skin normal     Back:   Symmetric, no curvature, spinous processes are non-tender on palpation, paraspinous muscles are non-tender on palpation, no costal vertebral tenderness     Lungs:   No increased work of breathing, good air exchange, clear to auscultation bilaterally, no crackles or wheezing     Cardiovascular:   Normal apical impulse, regular rate and rhythm, normal S1 and S2, no S3 or S4, and no murmur noted     Chest / Breast:   Breasts symmetrical, skin without lesion(s), no nipple retraction or dimpling, no nipple discharge, no masses palpated, no axillary or supraclavicular adenopathy         Abdomen:   No scars, normal bowel sounds, soft, non-distended, non-tender, no masses palpated, no hepatosplenomegally     Musculoskeletal:   There is no redness, warmth, or swelling of the joints.  Full range of motion noted.  Motor strength is 5 out of 5 all extremities bilaterally.  Tone is normal.     Neurologic:   Awake, alert, oriented to name, place and time.  Cranial nerves II-XII are grossly intact.  Motor is 5 out of 5 bilaterally.  Cerebellar finger to nose, heel to shin intact.  Sensory is intact.  Babinski down going, Romberg negative, and gait is normal.             Data:   All laboratory data reviewed  Results for orders placed or performed during the hospital encounter of 01/20/22 (from the past 24 hour(s))   CBC with platelets + differential    Narrative    The following orders were created for panel order CBC with platelets + differential.  Procedure                               Abnormality         Status                     ---------                                -----------         ------                     CBC with platelets and d...[[id number]]  Abnormal            Final result                 Please view results for these tests on the individual orders.   Basic metabolic panel   Result Value Ref Range    Sodium 140 136 - 145 mmol/L    Potassium 3.9 3.5 - 5.0 mmol/L    Chloride 102 98 - 107 mmol/L    Carbon Dioxide (CO2) 24 22 - 31 mmol/L    Anion Gap 14 5 - 18 mmol/L    Urea Nitrogen 15 8 - 22 mg/dL    Creatinine 0.82 0.60 - 1.10 mg/dL    Calcium 10.5 8.5 - 10.5 mg/dL    Glucose 238 (H) 70 - 125 mg/dL    GFR Estimate 80 >60 mL/min/1.73m2   Lipase   Result Value Ref Range    Lipase 13 0 - 52 U/L   Hepatic function panel   Result Value Ref Range    Bilirubin Total 0.7 0.0 - 1.0 mg/dL    Bilirubin Direct 0.3 <=0.5 mg/dL    Protein Total 8.1 (H) 6.0 - 8.0 g/dL    Albumin 4.1 3.5 - 5.0 g/dL    Alkaline Phosphatase 104 45 - 120 U/L    AST 21 0 - 40 U/L    ALT 26 0 - 45 U/L   CBC with platelets and differential   Result Value Ref Range    WBC Count 9.3 4.0 - 11.0 10e3/uL    RBC Count 5.64 (H) 3.80 - 5.20 10e6/uL    Hemoglobin 14.6 11.7 - 15.7 g/dL    Hematocrit 45.4 35.0 - 47.0 %    MCV 81 78 - 100 fL    MCH 25.9 (L) 26.5 - 33.0 pg    MCHC 32.2 31.5 - 36.5 g/dL    RDW 13.9 10.0 - 15.0 %    Platelet Count 322 150 - 450 10e3/uL    % Neutrophils 94 %    % Lymphocytes 5 %    % Monocytes 1 %    % Eosinophils 0 %    % Basophils 0 %    % Immature Granulocytes 0 %    NRBCs per 100 WBC 0 <1 /100    Absolute Neutrophils 8.7 (H) 1.6 - 8.3 10e3/uL    Absolute Lymphocytes 0.5 (L) 0.8 - 5.3 10e3/uL    Absolute Monocytes 0.1 0.0 - 1.3 10e3/uL    Absolute Eosinophils 0.0 0.0 - 0.7 10e3/uL    Absolute Basophils 0.0 0.0 - 0.2 10e3/uL    Absolute Immature Granulocytes 0.0 <=0.4 10e3/uL    Absolute NRBCs 0.0 10e3/uL   CT Abdomen Pelvis w Contrast    Narrative    EXAM: CT ABDOMEN PELVIS W CONTRAST  LOCATION: [facility]  DATE/TIME: 1/20/2022 11:19 PM    INDICATION:  Right lower quadrant abdominal pain.  COMPARISON: PET/CT on 421  TECHNIQUE: CT scan of the abdomen and pelvis was performed following injection of IV contrast. Multiplanar reformats were obtained. Dose reduction techniques were used.  CONTRAST: [id number]    FINDINGS:   LOWER CHEST: Minimal change in pulmonary nodules posterior left lower lobe and anterior right middle lobe.    HEPATOBILIARY: Normal.    PANCREAS: Normal.    SPLEEN: Normal.    ADRENAL GLANDS: Normal.    KIDNEYS/BLADDER: Normal.    BOWEL: Prominent fluid distention of stomach with dilated proximal jejunum demonstrating circumferential wall thickening suggesting intramural edema or hemorrhage at and just beyond the ligament of Treitz. There is a transitional point involving proximal   small bowel within left upper quadrant with remainder of small bowel decompressed and empty. Colon also decompressed. Etiology for the obstruction is uncertain, no mass is identified. No suggestion for volvulus. Uncertain if the proximal small bowel   wall thickening as the cause of the gastric outlet obstruction or the result of a focal obstruction just beyond the zone of wall thickening.    LYMPH NODES: Normal.    VASCULATURE: Unremarkable.    PELVIC ORGANS: Trace volume ascites. No adnexal lesion.    MUSCULOSKELETAL: No suspicious bony lesion.      Impression    IMPRESSION:   1.  High-grade proximal small bowel obstruction with prominent fluid distention of stomach. Prominent circumferential wall thickening involving distal most duodenum and proximal most jejunum, both intramural edema and hemorrhage could have this   appearance. There is no obvious volvulus or internal hernia but can't be excluded with etiology for the obstruction uncertain.   Asymptomatic COVID-19 Virus (Coronavirus) by PCR Nasopharyngeal    Specimen: Nasopharyngeal; Swab   Result Value Ref Range    SARS CoV2 PCR Negative Negative    Narrative    Testing was performed using the angus  SARS-CoV-2  & Influenza A/B Assay on the angus  [place]  System.  This test should be ordered for the detection of SARS-COV-2 in individuals who meet SARS-CoV-2 clinical and/or epidemiological criteria. Test performance is unknown in asymptomatic patients.  This test is for in vitro diagnostic use under the FDA EUA for laboratories certified under CLIA to perform moderate and/or high complexity testing. This test has not been FDA cleared or approved.  A negative test does not rule out the presence of PCR inhibitors in the specimen or target RNA in concentration below the limit of detection for the assay. The possibility of a false negative should be considered if the patient's recent exposure or clinical presentation suggests COVID-19.  [facility] are certified under the Clinical Laboratory Improvement Amendments of 1988 (CLIA-88) as qualified to perform moderate and/or high complexity laboratory testing.   Lactic acid whole blood   Result Value Ref Range    Lactic Acid 1.8 0.7 - 2.0 mmol/L   INR   Result Value Ref Range    INR 1.12 0.85 - 1.15   Glucose by meter   Result Value Ref Range    GLUCOSE BY METER POCT 203 (H) 70 - 99 mg/dL   Glucose by meter   Result Value Ref Range    GLUCOSE BY METER POCT 217 (H) 70 - 99 mg/dL     All imaging studies reviewed by me.     Attestation:  I have reviewed today's vital signs, notes, medications, labs and imaging.    Rigoberto Wing MD

## 2022-01-21 NOTE — ED NOTES
Continues to complain of headache. States 2/10.  Also states has history of HTN and would like to know if she should take her antihypertensive medication. Boarding resident notified.

## 2022-01-21 NOTE — ED NOTES
Resting comfortably. No complaints at present. Continues to wait for inpatient bed. Brown liquid noted from NG tube

## 2022-01-21 NOTE — PROGRESS NOTES
Morphine given x1 for abd pain. 750mL of output from NG from time of placement to end of shift. Nausea has subsided and pt has been able to rest comfortably between cares. Hydralazine given x1 for high BP's; effective w/ SBP in 130s.

## 2022-01-21 NOTE — PHARMACY-ADMISSION MEDICATION HISTORY
Pharmacy Note - Admission Medication History    Pertinent Provider Information: n/a     ______________________________________________________________________    Prior To Admission (PTA) med list completed and updated in EMR.       PTA Med List   Medication Sig Last Dose     albuterol (PROAIR HFA/PROVENTIL HFA/VENTOLIN HFA) 108 (90 Base) MCG/ACT inhaler Inhale 2 puffs into the lungs every 6 hours (Patient taking differently: Inhale 2 puffs into the lungs every 6 hours as needed ) PRN, not recent.  on hand if traveling     atorvastatin (LIPITOR) 20 MG tablet Take 1 tablet (20 mg) by mouth daily 1/19/2022     everolimus (AFINITOR) 10 MG tablet Take 10 mg by mouth daily 1/19/2022     exemestane (AROMASIN) 25 mg tablet Take 25 mg by mouth daily  1/19/2022     glipiZIDE (GLUCOTROL) 5 MG tablet Take 1 tablet (5 mg) by mouth daily 1/19/2022     ibuprofen (ADVIL,MOTRIN) 600 MG tablet Take 600 mg by mouth every 6 hours as needed  PRN     lisinopril (ZESTRIL) 30 MG tablet [LISINOPRIL (PRINIVIL,ZESTRIL) 30 MG TABLET] TAKE 1 TABLET(30 MG) BY MOUTH DAILY 1/19/2022       [facility](s[facility]  Method of interview communication: in-person    Summary of Changes to PTA Med List  New: everolimus  Discontinued: none  Changed: albuterol to PRN    Patient was asked about OTC/herbal products specifically.  PTA med list reflects this.    In the past week, patient estimated taking medication this percent of the time:  greater than 90%.    Allergies were reviewed, assessed, and updated with the patient.      Patient does not anticipate needing any multi-use medications during admission.    The information provided in this note is only as accurate as the sources available at the time of the update(s).    Thank you for the opportunity to participate in the care of this patient.    Yara Holland [facility]  1/21/2022 7:43 AM

## 2022-01-21 NOTE — CONSULTS
Care Management Initial Consult    General Information  Assessment completed with: Patient,    Type of CM/SW Visit: CM Role Introduction (Initial assessment as well.)    Primary Care Provider verified and updated as needed: Yes   Readmission within the last 30 days: no previous admission in last 30 days      Reason for Consult: discharge planning  Advance Care Planning: Advance Care Planning Reviewed: questions answered,education/resources on health care directives provided        Communication Assessment  Patient's communication style: spoken language (English or Bilingual)    Hearing Difficulty or Deaf: no   Wear Glasses or Blind: no    Cognitive  Cognitive/Neuro/Behavioral: orientation          Orientation: oriented x 4             Living Environment:   People in home: child(tate), adult,grandchild(tate),spouse     Current living Arrangements: house      Able to return to prior arrangements: yes     Family/Social Support:  Care provided by: self  Provides care for: no one  Marital Status:   ,Children  Reyna KINENY       Description of Support System: Supportive,Involved    Support Assessment: Adequate family and caregiver support,Adequate social supports    Current Resources:   Patient receiving home care services: No     Community Resources: None  Equipment currently used at home: glucometer  Supplies currently used at home: Diabetic Supplies    Employment/Financial:  Employment Status:   Undetermined       Financial Concerns: No concerns identified   Referral to Financial Counselor: No     Lifestyle & Psychosocial Needs:  Social Determinants of Health     Tobacco Use: Low Risk      Smoking Tobacco Use: Never Smoker     Smokeless Tobacco Use: Never Used   Alcohol Use: Not on file   Financial Resource Strain: Not on file   Food Insecurity: Not on file   Transportation Needs: Not on file   Physical Activity: Not on file   Stress: Not on file   Social Connections: Not on file   Intimate Partner  "Violence: Not on file   Depression: Not at risk     PHQ-2 Score: 0   Housing Stability: Not on file     Functional Status:  Prior to admission patient needed assistance:   Dependent ADLs:: Independent  Dependent IADLs:: Transportation (Family transports as needed without issue.)  Assesssment of Functional Status: Not at  functional baseline    Mental Health Status:  Mental Health Status: No Current Concerns       Chemical Dependency Status:  Chemical Dependency Status: No Current Concerns           Values/Beliefs:  Spiritual, Cultural Beliefs, Congregational Practices, Values that affect care: no             Additional Information:  Writer met with pt to introduce Care Management service(CM) and obtain an initial assessment. Pt states sharing a house with her , a daughter, son-in-law, and some grandchildren. Pt is I/ADL independent at baseline. No DME use or need for in-home services. Pt states whether or not she has surgery, she intends on a retun home at time of discharge. Family to support as needed at home and provide transportation as well. No concerns identified.    1/21 H&P snippets per Ruben Ritter -\"61 year old female who  has no past medical history on file.  Breast cancer on chemotherapy, type 2 diabetes, hypertension\" and \"reports ongoing periumbilical/right-sided abdominal pain since noon. Associated with reflux symptoms. Presented to an urgent care which directed the patient towards ED given concern of appendicitis versus SBO. Patient says she is still having nausea but pain right now is tolerable. She feels dry and is requesting mouth swabs as needed. Denies chest pain, shortness of breath. All other systems reviewed and are negative. In the ED, patient was afebrile, somewhat hypertensive. Blood glucose 238, no leukocytosis or anemia. Metabolic panel fairly unremarkable. CT showed high-grade proximal small bowel obstruction, prominent circumferential wall thickening distal duodenum with intramural " "edema.\"    Anticipate return home via family without issue or services. Surgical interventions may alter current anticipated disposition. CM to follow and assist with discharge needs if and when they arise.    Fran Diaz RN        "

## 2022-01-21 NOTE — PROGRESS NOTES
[facility]  Hospitalist Progress Note    Admit Date:  2022  Date of Service (when I saw the patient): 2022   Provider:  Joyce Farrell DO    Assessment & Plan   Sierra Pelaez is a 61 year old female with a past history of HTN, breast cancer, prior  who was admitted earlier today to the hospital by Dr. Mendez with abd pain.  CT imaging revealed small bowel obstruction.  NG tube placed with 750cc outpt and gen surgery consulted.      Problem List:    1. Proximal small bowel obstruction  Gen surgery consult appreciated  Past surgery h/o   Continue with IVF and NG tube to LIS  Remain NPO  Plan for gastrograffin upper GI 22  Continue with PRN antiemetics and IV dilaudid, as needed    2.  HTN  PTA meds include lisinopril 30mg/day  SBP was very elevated on admission - no improved after NG decompression  If sbp again becomes elevated - will consider scheduling IV enalaprilat while NPO    3.  DM  Will continue to hold PTA glucotrol while NPO  sliding scale insulin prn    4.  H/o breast cancer  S/p bilateral mastectomy  Will need to hold PTA afinitor and aromasin, for now    5.  HLD  Hold PTA statin while NPO    6. Sinus tachycardia  Improved with NG tube and NS bolus given on admission  Will continue to monitor       Diet: NPO for Medical/Clinical Reasons Except for: Other; Specify: sponge mouth swabs okay    DVT Prophylaxis: Pneumatic Compression Devices for now  If needing prolonged hospital stay - then will need to start subcutaneous lovenox 22  Patel Catheter: Not present  Code Status: Full Code      Disposition Plan   Expected Discharge: >/ 2 midnight stay anticipated    Entered: Joyce Farrell DO 2022, 7:14 AM       The patient's care was discussed with the Patient.    We are operating under sub optimal conditions in the setting of a world wide pandemic, hospitals are running at full capacity with limited bed availability. We are  providing the best possible patient care with limited resources.    Interval History   Having some throat discomfort from NG tube.  Abd pain much improved.  No nausea at this time and less distended.  No CP or SOB.      -Data reviewed today: I reviewed all new labs and imaging results over the last 24 hours. I personally reviewed no images or EKG's today.    Physical Exam   Temp: 98.2  F (36.8  C) Temp src: Temporal BP: 131/64 Pulse: 112   Resp: 16 SpO2: 95 % O2 Device: None (Room air)    Vitals:    01/20/22 2109   Weight: 58.5 kg (129 lb)     Vital Signs with Ranges  Temp:  [98  F (36.7  C)-98.2  F (36.8  C)] 98.2  F (36.8  C)  Pulse:  [] 112  Resp:  [16] 16  BP: (130-206)/() 131/64  SpO2:  [95 %-99 %] 95 %  I/O last 3 completed shifts:  In: 1000 [IV Piggyback:1000]  Out: 750 [Emesis/NG output:750]    GEN:  Alert, oriented x 3, mildly uncomfortable with NG tube in place, somewhat tearful at times  HEENT:  Normocephalic/atraumatic, no scleral icterus, no nasal discharge, mouth and membranes slightly dry.  NG tube in place  NECK:  No clear thyromegaly or JVD  CV:  Regular rate and rhythm, no loud murmur to ausc.  S1 + S2 noted, no S3 or S4.  LUNGS:  Clear to auscultation ant/lat bilaterally. Symmetric chest rise on inhalation noted.  ABD:  slower bowel sounds, soft, non-tender/non-distended at this time  No rebound/guarding/rigidity.  No masses palpated.    EXT:  No edema or cyanosis bilaterally. No joint synovitis noted.  No calf-tenderness or asymmetry noted.  NEURO:  Speech is clear and appropriate.  CN 2-12 intact to gross testing bilaterally, no tremors at rest  PSYCH:  Tearful at times, cooperative    Data   Labs:  Recent Labs   Lab 01/21/22  1310 01/21/22  0637 01/21/22  0322 01/20/22  2246 01/20/22  0850   NA  --   --   --  140 139   POTASSIUM  --   --   --  3.9 4.1   CHLORIDE  --   --   --  102 104   CO2  --   --   --  24 21*   ANIONGAP  --   --   --  14 14   * 217* 203* 238* 211*   BUN   --   --   --  15 13   CR  --   --   --  0.82 0.80   GFRESTIMATED  --   --   --  80 82   ANIKA  --   --   --  10.5 10.2     Recent Labs   Lab 01/20/22  2246   WBC 9.3   HGB 14.6   HCT 45.4   MCV 81        Recent Labs   Lab 01/21/22  1310 01/21/22  0637 01/21/22  0322 01/20/22  2246 01/20/22  0850   NA  --   --   --  140 139   POTASSIUM  --   --   --  3.9 4.1   CHLORIDE  --   --   --  102 104   CO2  --   --   --  24 21*   ANIONGAP  --   --   --  14 14   * 217* 203* 238* 211*   BUN  --   --   --  15 13   CR  --   --   --  0.82 0.80   GFRESTIMATED  --   --   --  80 82   ANIKA  --   --   --  10.5 10.2   PROTTOTAL  --   --   --  8.1* 7.5   ALBUMIN  --   --   --  4.1 4.0   BILITOTAL  --   --   --  0.7 0.7   ALKPHOS  --   --   --  104 106   AST  --   --   --  21 20   ALT  --   --   --  26 24      Recent Imaging:   Recent Results (from the past 24 hour(s))   CT Abdomen Pelvis w Contrast    Narrative    EXAM: CT ABDOMEN PELVIS W CONTRAST  LOCATION: [facility]  DATE/TIME: 1/20/2022 11:19 PM    INDICATION: Right lower quadrant abdominal pain.  COMPARISON: PET/CT on 421  TECHNIQUE: CT scan of the abdomen and pelvis was performed following injection of IV contrast. Multiplanar reformats were obtained. Dose reduction techniques were used.  CONTRAST: [id number]    FINDINGS:   LOWER CHEST: Minimal change in pulmonary nodules posterior left lower lobe and anterior right middle lobe.    HEPATOBILIARY: Normal.    PANCREAS: Normal.    SPLEEN: Normal.    ADRENAL GLANDS: Normal.    KIDNEYS/BLADDER: Normal.    BOWEL: Prominent fluid distention of stomach with dilated proximal jejunum demonstrating circumferential wall thickening suggesting intramural edema or hemorrhage at and just beyond the ligament of Treitz. There is a transitional point involving proximal   small bowel within left upper quadrant with remainder of small bowel decompressed and empty. Colon also decompressed. Etiology for the  obstruction is uncertain, no mass is identified. No suggestion for volvulus. Uncertain if the proximal small bowel   wall thickening as the cause of the gastric outlet obstruction or the result of a focal obstruction just beyond the zone of wall thickening.    LYMPH NODES: Normal.    VASCULATURE: Unremarkable.    PELVIC ORGANS: Trace volume ascites. No adnexal lesion.    MUSCULOSKELETAL: No suspicious bony lesion.      Impression    IMPRESSION:   1.  High-grade proximal small bowel obstruction with prominent fluid distention of stomach. Prominent circumferential wall thickening involving distal most duodenum and proximal most jejunum, both intramural edema and hemorrhage could have this   appearance. There is no obvious volvulus or internal hernia but can't be excluded with etiology for the obstruction uncertain.       Medications     lactated ringers 100 mL/hr at 01/21/22 0642       insulin aspart  1-3 Units Subcutaneous TID AC     insulin aspart  1-3 Units Subcutaneous At Bedtime     sodium chloride (PF)  3 mL Intracatheter Q8H

## 2022-01-21 NOTE — PROGRESS NOTES
High-grade small bowel obstruction  ED provider requested admission  Has discussed with general surgery who plans to see in the morning  Interesting intramural edema versus hemorrhage noted in duodenum/jejunum.  ED MD agrees to add on INR and lactic acid  Have accepted admission

## 2022-01-21 NOTE — H&P
"Hospital Medicine Service History and Physical  [facility]: [facility]    Sierra Pelaez is a 61 year old female who  has no past medical history on file.  Breast cancer on chemotherapy, type 2 diabetes, hypertension  Chief Complaint: Abdominal pain    Assessment and Plan  High-grade small bowel obstruction  ED discussed with gen surg  NGT placed  Check INR, lactic acid  hurricane spray   Mouth swab for comfort okay  Prn zofran, IV morphine    Sinus tachycardia  Likely hypovolemic given emesis and inability to tolerate p.o.  Bolus ordered now    Type 2 diabetes without long-term insulin use  Takes glipizide at home   Blood glucose 238, order sliding scale insulin mild    Essential hypertension  Holding lisinopril    History of right breast cancer status post bilateral mastectomy    DVTP: held for potential surgery  Code Status: No Order  Disposition: Inpatient   Estimated body mass index is 26.96 kg/m  as calculated from the following:    Height as of 9/2/20: 1.473 m (4' 10\").    Weight as of this encounter: 58.5 kg (129 lb).    History of Present Illness  Sierra Pelaez reports ongoing periumbilical/right-sided abdominal pain since noon.  Associated with reflux symptoms.  Presented to an urgent care which directed the patient towards ED given concern of appendicitis versus SBO.  Patient says she is still having nausea but pain right now is tolerable.  She feels dry and is requesting mouth swabs as needed.  Denies chest pain, shortness of breath.  All other systems reviewed and are negative  In the ED, patient was afebrile, somewhat hypertensive.  Blood glucose 238, no leukocytosis or anemia.  Metabolic panel fairly unremarkable.  CT showed high-grade proximal small bowel obstruction, prominent circumferential wall thickening distal duodenum with intramural edema.    Appears fatigued  Anicteric conjunctiva, PERRL  dry mucous membranes, intact dentition, NGT in place  Trachea midline, no jvd  cta b/l, " Respiratory effort normal on RA  Tachycardic, regular, no murmur  Abdomen soft, nondistended, tender  no edema, clubbing absent  Skin normal temperature, dry  normal affect, alert  Vital signs reviewed by me    Wt Readings from Last 4 Encounters:   01/20/22 58.5 kg (129 lb)   01/20/22 58.6 kg (129 lb 3.2 oz)   03/09/21 53.1 kg (117 lb)   12/08/20 53.1 kg (117 lb)        reports that she has never smoked. She has never used smokeless tobacco. She reports previous alcohol use. She reports that she does not use drugs.  family history includes Breast Cancer in her sister; Cancer in her mother; Hyperlipidemia in her brother; Hypertension in her mother; Lymphoma in her sister; Transient ischemic attack in her father.   has a past surgical history that includes Mastectomy (Bilateral, 02/01/2016).   Allergies   Allergen Reactions     Codeine Hives     Family is allergic to codeine so pt wants to stay away from it.      Metformin Diarrhea     Shrimp Itching     Shrimp [Shrimp Extract Allergy Skin Test] Itching     Shellfish Containing Products [Shellfish-Derived Products] Rash       Ruben Mendez MD, MPH  [facility]  [facility]   Phone: #[phone]

## 2022-01-21 NOTE — ED PROVIDER NOTES
EMERGENCY DEPARTMENT ENCOUNTER      NAME: Sierra Pelaez  AGE: 61 year old female  YOB: 1960  MRN: [id number]  EVALUATION DATE & TIME: No admission date for patient encounter.    PCP: Elmer Emmanuel    ED PROVIDER: Danny Abebe D.O.      Chief Complaint   Patient presents with     Abdominal Pain       FINAL IMPRESSION:  1. Small bowel obstruction (H)        ED COURSE & MEDICAL DECISION MAKIN:28 PM I met with the patient to gather history and to perform my initial exam. I discussed the plan for care while in the Emergency Department.  12:33 AM I spoke to Dr. Vuong with general surgery regarding plan for care. Plan for admission.  12:51 AM I spoke to hospitalist Dr. Mendez regarding plan for admission. Will add INR and lactate.         Pertinent Labs & Imaging studies reviewed. (See chart for details)  61 year old female presents to the Emergency Department for evaluation of abdominal pain with nausea and vomiting.  Patient was sent to the emergency department from the urgent care for imaging.  CT imaging tonight does show apparent bowel obstruction, at the point of the duodenal jejunal junction.  Etiology of this is uncertain though edema versus hematoma is possible, but cannot fully rule out metastasis of her breast cancer.  NG tube was placed in the emergency department, and general surgery has been consulted.  Patient will be admitted for further management.    At the conclusion of the encounter I discussed the results of all of the tests and the disposition. The questions were answered. The patient or family acknowledged understanding and was agreeable with the care plan.      HPI    Patient information was obtained from: Patient     Use of : N/A        Sierra Pelaez is a 61 year old female with a pertinent history of breast cancer on chemo, DM type II, HTN, who presents via walk-in for evaluation of abdominal pain.    Patient reports she has had ongoing periumbilical to right sided  abdominal pain since waking up from a nap around 12:00 PM (9 hours ago). She notes that she had eaten yogurt and rice last night which is out of the norm for her. Upon waking up early this morning the patient did have some reflux symptoms, but no abdominal pain. She states she presented to  after onset of pain and vomited 2x while in the waiting room. She was then referred to this ED with concern for appendicitis vs SBO. Patient does note a history of similar pain and vomiting years ago after eating mushrooms which was completely resolved with Zofran.     Patient denies any fever, or any other complaints.    Patient is non smoker and non drinker.      REVIEW OF SYSTEMS  Constitutional:  Denies fever, chills, weight loss or weakness  Eyes:  No pain, discharge, redness  HENT:  Denies sore throat, ear pain, congestion  Respiratory: No SOB, wheeze or cough  Cardiovascular:  No CP, palpitations  GI:  Positive for periumbilical to right sided abdominal pain, vomiting Denies diarrhea  : Denies dysuria, hematuria  Musculoskeletal:  Denies any new muscle/joint pain, swelling or loss of function.  Skin:  Denies rash, pallor  Neurologic:  Denies headache, focal weakness or sensory changes  Lymph: Denies swollen nodes    All other systems negative unless noted in HPI.    PAST MEDICAL HISTORY:  History reviewed. No pertinent past medical history.    PAST SURGICAL HISTORY:  Past Surgical History:   Procedure Laterality Date     MASTECTOMY Bilateral 02/01/2016         CURRENT MEDICATIONS:    Current Facility-Administered Medications   Medication     benzocaine 20% (HURRICAINE/TOPEX) 20 % spray 0.5-1 mL     glucose gel 15-30 g    Or     dextrose 50 % injection 25-50 mL    Or     glucagon injection 1 mg     insulin aspart (NovoLOG) injection (RAPID ACTING)     insulin aspart (NovoLOG) injection (RAPID ACTING)     lactated ringers BOLUS 1,000 mL     ondansetron (ZOFRAN) injection 4 mg     Current Outpatient Medications    Medication     albuterol (PROAIR HFA/PROVENTIL HFA/VENTOLIN HFA) 108 (90 Base) MCG/ACT inhaler     atorvastatin (LIPITOR) 20 MG tablet     blood glucose (NO BRAND SPECIFIED) lancets standard     blood glucose (NO BRAND SPECIFIED) test strip     blood glucose meter (GLUCOMETER)     blood glucose test strips     blood pressure monitor Kit     exemestane (AROMASIN) 25 mg tablet     generic lancets (FINGERSTIX LANCETS)     glipiZIDE (GLUCOTROL) 5 MG tablet     ibuprofen (ADVIL,MOTRIN) 600 MG tablet     lisinopril (ZESTRIL) 30 MG tablet         ALLERGIES:  Allergies   Allergen Reactions     Codeine Hives     Family is allergic to codeine so pt wants to stay away from it.      Metformin Diarrhea     Shrimp Itching     Shrimp [Shrimp Extract Allergy Skin Test] Itching     Shellfish Containing Products [Shellfish-Derived Products] Rash       FAMILY HISTORY:  Family History   Problem Relation Age of Onset     Cancer Mother         bladder or uterine     Hypertension Mother      Transient ischemic attack Father      Breast Cancer Sister      Hyperlipidemia Brother      Lymphoma Sister        SOCIAL HISTORY:  Social History     Socioeconomic History     Marital status:      Spouse name: Not on file     Number of children: Not on file     Years of education: Not on file     Highest education level: Not on file   Occupational History     Not on file   Tobacco Use     Smoking status: Never Smoker     Smokeless tobacco: Never Used   Substance and Sexual Activity     Alcohol use: Not Currently     Drug use: Never     Sexual activity: Not on file   Other Topics Concern     Not on file   Social History Narrative     Not on file     Social Determinants of Health     Financial Resource Strain: Not on file   Food Insecurity: Not on file   Transportation Needs: Not on file   Physical Activity: Not on file   Stress: Not on file   Social Connections: Not on file   Intimate Partner Violence: Not on file   Housing Stability: Not on  file       VITALS:  Patient Vitals for the past 24 hrs:   BP Temp Temp src Pulse Resp SpO2 Weight   01/20/22 2300 (!) 167/94 -- -- 99 -- 99 % --   01/20/22 2109 (!) 202/112 98  F (36.7  C) Oral 117 16 98 % 58.5 kg (129 lb)       PHYSICAL EXAM    VITAL SIGNS: BP (!) 167/94   Pulse 99   Temp 98  F (36.7  C) (Oral)   Resp 16   Wt 58.5 kg (129 lb)   SpO2 99%   BMI 26.96 kg/m      General Appearance: Well-appearing, well-nourished, no acute distress   Head:  Normocephalic, without obvious abnormality, atraumatic  Eyes:  PERRL, conjunctiva/corneas clear, EOM's intact,  ENT:  Lips, mucosa, and tongue normal, membranes are moist without pallor  Neck:  Normal ROM, symmetrical, trachea midline    Cardio:  Borderline tachycardia, Regular rhythm, no murmur, rub or gallop, 2+ pulses symmetric in all extremities  Pulm:  Clear to auscultation bilaterally, respirations unlabored,  Abdomen: Periumbilical to right sided tenderness, Soft, no rebound or guarding.  Musculoskeletal: Full ROM, no edema, no cyanosis, good ROM of major joints  Integument:  Warm, Dry, No erythema, No rash.    Neurologic:  Alert & oriented.  No focal deficits appreciated.  Ambulatory.  Psychiatric:  Affect normal, Judgment normal, Mood normal.      LABS  Results for orders placed or performed during the hospital encounter of 01/20/22 (from the past 24 hour(s))   CBC with platelets + differential    Narrative    The following orders were created for panel order CBC with platelets + differential.  Procedure                               Abnormality         Status                     ---------                               -----------         ------                     CBC with platelets and d...[[id number]]  Abnormal            Final result                 Please view results for these tests on the individual orders.   Basic metabolic panel   Result Value Ref Range    Sodium 140 136 - 145 mmol/L    Potassium 3.9 3.5 - 5.0 mmol/L    Chloride 102 98 - 107  mmol/L    Carbon Dioxide (CO2) 24 22 - 31 mmol/L    Anion Gap 14 5 - 18 mmol/L    Urea Nitrogen 15 8 - 22 mg/dL    Creatinine 0.82 0.60 - 1.10 mg/dL    Calcium 10.5 8.5 - 10.5 mg/dL    Glucose 238 (H) 70 - 125 mg/dL    GFR Estimate 80 >60 mL/min/1.73m2   Lipase   Result Value Ref Range    Lipase 13 0 - 52 U/L   Hepatic function panel   Result Value Ref Range    Bilirubin Total 0.7 0.0 - 1.0 mg/dL    Bilirubin Direct 0.3 <=0.5 mg/dL    Protein Total 8.1 (H) 6.0 - 8.0 g/dL    Albumin 4.1 3.5 - 5.0 g/dL    Alkaline Phosphatase 104 45 - 120 U/L    AST 21 0 - 40 U/L    ALT 26 0 - 45 U/L   CBC with platelets and differential   Result Value Ref Range    WBC Count 9.3 4.0 - 11.0 10e3/uL    RBC Count 5.64 (H) 3.80 - 5.20 10e6/uL    Hemoglobin 14.6 11.7 - 15.7 g/dL    Hematocrit 45.4 35.0 - 47.0 %    MCV 81 78 - 100 fL    MCH 25.9 (L) 26.5 - 33.0 pg    MCHC 32.2 31.5 - 36.5 g/dL    RDW 13.9 10.0 - 15.0 %    Platelet Count 322 150 - 450 10e3/uL    % Neutrophils 94 %    % Lymphocytes 5 %    % Monocytes 1 %    % Eosinophils 0 %    % Basophils 0 %    % Immature Granulocytes 0 %    NRBCs per 100 WBC 0 <1 /100    Absolute Neutrophils 8.7 (H) 1.6 - 8.3 10e3/uL    Absolute Lymphocytes 0.5 (L) 0.8 - 5.3 10e3/uL    Absolute Monocytes 0.1 0.0 - 1.3 10e3/uL    Absolute Eosinophils 0.0 0.0 - 0.7 10e3/uL    Absolute Basophils 0.0 0.0 - 0.2 10e3/uL    Absolute Immature Granulocytes 0.0 <=0.4 10e3/uL    Absolute NRBCs 0.0 10e3/uL   CT Abdomen Pelvis w Contrast    Narrative    EXAM: CT ABDOMEN PELVIS W CONTRAST  LOCATION: [facility]  DATE/TIME: 1/20/2022 11:19 PM    INDICATION: Right lower quadrant abdominal pain.  COMPARISON: PET/CT on 421  TECHNIQUE: CT scan of the abdomen and pelvis was performed following injection of IV contrast. Multiplanar reformats were obtained. Dose reduction techniques were used.  CONTRAST: [id number]    FINDINGS:   LOWER CHEST: Minimal change in pulmonary nodules posterior left lower  lobe and anterior right middle lobe.    HEPATOBILIARY: Normal.    PANCREAS: Normal.    SPLEEN: Normal.    ADRENAL GLANDS: Normal.    KIDNEYS/BLADDER: Normal.    BOWEL: Prominent fluid distention of stomach with dilated proximal jejunum demonstrating circumferential wall thickening suggesting intramural edema or hemorrhage at and just beyond the ligament of Treitz. There is a transitional point involving proximal   small bowel within left upper quadrant with remainder of small bowel decompressed and empty. Colon also decompressed. Etiology for the obstruction is uncertain, no mass is identified. No suggestion for volvulus. Uncertain if the proximal small bowel   wall thickening as the cause of the gastric outlet obstruction or the result of a focal obstruction just beyond the zone of wall thickening.    LYMPH NODES: Normal.    VASCULATURE: Unremarkable.    PELVIC ORGANS: Trace volume ascites. No adnexal lesion.    MUSCULOSKELETAL: No suspicious bony lesion.      Impression    IMPRESSION:   1.  High-grade proximal small bowel obstruction with prominent fluid distention of stomach. Prominent circumferential wall thickening involving distal most duodenum and proximal most jejunum, both intramural edema and hemorrhage could have this   appearance. There is no obvious volvulus or internal hernia but can't be excluded with etiology for the obstruction uncertain.         RADIOLOGY  CT Abdomen Pelvis w Contrast   Final Result   IMPRESSION:    1.  High-grade proximal small bowel obstruction with prominent fluid distention of stomach. Prominent circumferential wall thickening involving distal most duodenum and proximal most jejunum, both intramural edema and hemorrhage could have this    appearance. There is no obvious volvulus or internal hernia but can't be excluded with etiology for the obstruction uncertain.             MEDICATIONS GIVEN IN THE EMERGENCY:  Medications   ondansetron (ZOFRAN) injection 4 mg (has no  administration in time range)   benzocaine 20% (HURRICAINE/TOPEX) 20 % spray 0.5-1 mL (has no administration in time range)   lactated ringers BOLUS 1,000 mL (has no administration in time range)   glucose gel 15-30 g (has no administration in time range)     Or   dextrose 50 % injection 25-50 mL (has no administration in time range)     Or   glucagon injection 1 mg (has no administration in time range)   insulin aspart (NovoLOG) injection (RAPID ACTING) (has no administration in time range)   insulin aspart (NovoLOG) injection (RAPID ACTING) (has no administration in time range)   ondansetron (ZOFRAN) injection 4 mg (4 mg Intravenous Given 1/20/22 2253)   0.9% sodium chloride BOLUS (1,000 mLs Intravenous New Bag 1/21/22 0015)   iopamidol (ISOVUE-370) solution 100 mL (100 mLs Intravenous Given 1/20/22 2333)       NEW PRESCRIPTIONS STARTED AT TODAY'S ER VISIT  New Prescriptions    No medications on file          I, Rod Bowen, am serving as a scribe to document services personally performed by Danny Abebe DO, based on my observations and the provider's statements to me.  I, Danny Abebe DO, attest that Rod Bowen is acting in a scribe capacity, has observed my performance of the services and has documented them in accordance with my direction.      Danny Abebe D.O.  Emergency Medicine  [facility] EMERGENCY ROOM  [address], [place] [id number] [phone]  Dept: [phone]     Danny Abebe DO  01/21/22 9849

## 2022-01-21 NOTE — PROGRESS NOTES
Brief Progress Note:    Patient complaining of headache, 2/10, but bothering her enough to want treatment. Also very concerned about her blood pressure and wondering if she needs her daily lisinopril. Current /70.    - will order IV tylenol x1  - discussed that we will continue to monitor her blood pressure, but she can't have PO medications due to SBO and NG tube on suction. No intervention needed at this time.  - notified primary hospitalist    Briseida Yañez MD, PGY-2  [facility]  January 21, 2022

## 2022-01-21 NOTE — ED TRIAGE NOTES
"Pt presents to the ED with c/o abdominal pain since 1200 today. Pt sent from  for rule out of appendicitis vs SBO. Pt states pain \"a little better\" 1/10  "

## 2022-01-22 ENCOUNTER — APPOINTMENT (OUTPATIENT)
Dept: RADIOLOGY | Facility: CLINIC | Age: 62
End: 2022-01-22
Attending: SURGERY
Payer: COMMERCIAL

## 2022-01-22 LAB
ANION GAP SERPL CALCULATED.3IONS-SCNC: 13 MMOL/L (ref 5–18)
BUN SERPL-MCNC: 12 MG/DL (ref 8–22)
CALCIUM SERPL-MCNC: 8.6 MG/DL (ref 8.5–10.5)
CHLORIDE BLD-SCNC: 109 MMOL/L (ref 98–107)
CO2 SERPL-SCNC: 23 MMOL/L (ref 22–31)
CREAT SERPL-MCNC: 0.65 MG/DL (ref 0.6–1.1)
ERYTHROCYTE [DISTWIDTH] IN BLOOD BY AUTOMATED COUNT: 14.2 % (ref 10–15)
GFR SERPL CREATININE-BSD FRML MDRD: >90 ML/MIN/1.73M2
GLUCOSE BLD-MCNC: 134 MG/DL (ref 70–125)
HBA1C MFR BLD: 7.9 %
HCT VFR BLD AUTO: 36.8 % (ref 35–47)
HGB BLD-MCNC: 11.7 G/DL (ref 11.7–15.7)
MCH RBC QN AUTO: 25.8 PG (ref 26.5–33)
MCHC RBC AUTO-ENTMCNC: 31.8 G/DL (ref 31.5–36.5)
MCV RBC AUTO: 81 FL (ref 78–100)
PLATELET # BLD AUTO: 263 10E3/UL (ref 150–450)
POTASSIUM BLD-SCNC: 3.4 MMOL/L (ref 3.5–5)
RBC # BLD AUTO: 4.53 10E6/UL (ref 3.8–5.2)
SODIUM SERPL-SCNC: 145 MMOL/L (ref 136–145)
WBC # BLD AUTO: 9.2 10E3/UL (ref 4–11)

## 2022-01-22 PROCEDURE — 250N000011 HC RX IP 250 OP 636: Performed by: INTERNAL MEDICINE

## 2022-01-22 PROCEDURE — 74240 X-RAY XM UPR GI TRC 1CNTRST: CPT

## 2022-01-22 PROCEDURE — 258N000003 HC RX IP 258 OP 636: Performed by: HOSPITALIST

## 2022-01-22 PROCEDURE — 99232 SBSQ HOSP IP/OBS MODERATE 35: CPT | Performed by: INTERNAL MEDICINE

## 2022-01-22 PROCEDURE — 250N000009 HC RX 250: Performed by: INTERNAL MEDICINE

## 2022-01-22 PROCEDURE — 99231 SBSQ HOSP IP/OBS SF/LOW 25: CPT | Performed by: SURGERY

## 2022-01-22 PROCEDURE — 85027 COMPLETE CBC AUTOMATED: CPT | Performed by: INTERNAL MEDICINE

## 2022-01-22 PROCEDURE — 120N000001 HC R&B MED SURG/OB

## 2022-01-22 PROCEDURE — 82310 ASSAY OF CALCIUM: CPT | Performed by: INTERNAL MEDICINE

## 2022-01-22 PROCEDURE — 250N000013 HC RX MED GY IP 250 OP 250 PS 637: Performed by: INTERNAL MEDICINE

## 2022-01-22 PROCEDURE — 36415 COLL VENOUS BLD VENIPUNCTURE: CPT | Performed by: INTERNAL MEDICINE

## 2022-01-22 RX ORDER — PANTOPRAZOLE SODIUM 20 MG/1
40 TABLET, DELAYED RELEASE ORAL
Status: DISCONTINUED | OUTPATIENT
Start: 2022-01-22 | End: 2022-01-23 | Stop reason: HOSPADM

## 2022-01-22 RX ORDER — LISINOPRIL 2.5 MG/1
2.5 TABLET ORAL DAILY
Status: DISCONTINUED | OUTPATIENT
Start: 2022-01-22 | End: 2022-01-23

## 2022-01-22 RX ORDER — HYDROMORPHONE HYDROCHLORIDE 1 MG/ML
0.5 INJECTION, SOLUTION INTRAMUSCULAR; INTRAVENOUS; SUBCUTANEOUS EVERY 4 HOURS PRN
Status: DISCONTINUED | OUTPATIENT
Start: 2022-01-22 | End: 2022-01-23 | Stop reason: HOSPADM

## 2022-01-22 RX ORDER — SUCRALFATE 1 G/1
1 TABLET ORAL
Status: DISCONTINUED | OUTPATIENT
Start: 2022-01-22 | End: 2022-01-23 | Stop reason: HOSPADM

## 2022-01-22 RX ADMIN — ENALAPRILAT 1.25 MG: 2.5 INJECTION INTRAVENOUS at 02:08

## 2022-01-22 RX ADMIN — SODIUM CHLORIDE, POTASSIUM CHLORIDE, SODIUM LACTATE AND CALCIUM CHLORIDE: 600; 310; 30; 20 INJECTION, SOLUTION INTRAVENOUS at 10:51

## 2022-01-22 RX ADMIN — SUCRALFATE 1 G: 1 TABLET ORAL at 22:45

## 2022-01-22 RX ADMIN — SUCRALFATE 1 G: 1 TABLET ORAL at 19:12

## 2022-01-22 RX ADMIN — PANTOPRAZOLE SODIUM 40 MG: 20 TABLET, DELAYED RELEASE ORAL at 19:12

## 2022-01-22 RX ADMIN — PANTOPRAZOLE SODIUM 40 MG: 20 TABLET, DELAYED RELEASE ORAL at 14:06

## 2022-01-22 RX ADMIN — LISINOPRIL 2.5 MG: 2.5 TABLET ORAL at 14:06

## 2022-01-22 RX ADMIN — ENOXAPARIN SODIUM 30 MG: 30 INJECTION SUBCUTANEOUS at 09:51

## 2022-01-22 RX ADMIN — DIATRIZOATE MEGLUMINE AND DIATRIZOATE SODIUM 100 ML: 660; 100 SOLUTION ORAL; RECTAL at 10:17

## 2022-01-22 RX ADMIN — SUCRALFATE 1 G: 1 TABLET ORAL at 14:06

## 2022-01-22 RX ADMIN — ENALAPRILAT 1.25 MG: 2.5 INJECTION INTRAVENOUS at 09:22

## 2022-01-22 RX ADMIN — SODIUM CHLORIDE, POTASSIUM CHLORIDE, SODIUM LACTATE AND CALCIUM CHLORIDE: 600; 310; 30; 20 INJECTION, SOLUTION INTRAVENOUS at 00:21

## 2022-01-22 ASSESSMENT — ACTIVITIES OF DAILY LIVING (ADL)
ADLS_ACUITY_SCORE: 10

## 2022-01-22 NOTE — PLAN OF CARE
Problem: Adult Inpatient Plan of Care  Goal: Optimal Comfort and Wellbeing  Outcome: Improving   Pt. Will rest tonight with minimal sleep interruptions.

## 2022-01-22 NOTE — CONSULTS
CONSULTING PHYSICIAN   Kelsey Alonso MBBS     REASON FOR CONSULTATION   Abnormal CT, SBO     CHIEF COMPLAINT   Sierra Pelaez came to the hospital for evaluation of n/v, abd pain     HISTORY OF PRESENT ILLNESS   Sierra Pelaez is a 61 year old female with hx of breast cancer, HTN, DM, admitted with abd pain, n/v    Patient reports a similar episode in 2020 but was able to take zofran and symptoms resolved.    Patient states on 1/20 she felt onset of abd pain after dinner (fish and yogurt) and then began to have abd distension and n/v.   She presented to the ER and a CT showed marked inflammation of a short section of distal duodenum/proximal jejunum.   She had an NG for decompression and then a follow-up GG upper GI showing similar findings.     Reports that all her meds are stable, has been on them over 6 months    Currently is feeling much improved. NG is clamped and tolerating clears.     She is allergic to shrimp but did not eat that. She believes her 2020 episode was due to eating mushrooms and avoids these now. Eats similar foods to the meal she had before admission frequently      PAST HISTORY   History reviewed. No pertinent past medical history.   Past Surgical History:   Procedure Laterality Date     MASTECTOMY Bilateral 02/01/2016        Family History Social History   Family History   Problem Relation Age of Onset     Cancer Mother         bladder or uterine     Hypertension Mother      Transient ischemic attack Father      Breast Cancer Sister      Hyperlipidemia Brother      Lymphoma Sister     Social History     Tobacco Use     Smoking status: Never Smoker     Smokeless tobacco: Never Used   Substance Use Topics     Alcohol use: Not Currently     Drug use: Never          MEDICATIONS & ALLERGIES   Medications Prior to Admission   Medication Sig Dispense Refill Last Dose     albuterol (PROAIR HFA/PROVENTIL HFA/VENTOLIN HFA) 108 (90 Base) MCG/ACT inhaler Inhale 2 puffs  into the lungs every 6 hours (Patient taking differently: Inhale 2 puffs into the lungs every 6 hours as needed ) 18 g 0 PRN, not recent.  on hand if traveling     atorvastatin (LIPITOR) 20 MG tablet Take 1 tablet (20 mg) by mouth daily 90 tablet 1 1/19/2022     everolimus (AFINITOR) 10 MG tablet Take 10 mg by mouth daily   1/19/2022     exemestane (AROMASIN) 25 mg tablet Take 25 mg by mouth daily    1/19/2022     glipiZIDE (GLUCOTROL) 5 MG tablet Take 1 tablet (5 mg) by mouth daily 90 tablet 1 1/19/2022     ibuprofen (ADVIL,MOTRIN) 600 MG tablet Take 600 mg by mouth every 6 hours as needed    PRN     lisinopril (ZESTRIL) 30 MG tablet [LISINOPRIL (PRINIVIL,ZESTRIL) 30 MG TABLET] TAKE 1 TABLET(30 MG) BY MOUTH DAILY 90 tablet 1 1/19/2022     blood glucose (NO BRAND SPECIFIED) lancets standard Use to test blood sugar 1 times daily or as directed. Dispense brand per insurance preference 100 each 0      blood glucose (NO BRAND SPECIFIED) test strip Use to test blood sugar 1 times daily or as directed. Ok to dispense per insurance preference 100 strip 0      blood glucose meter (GLUCOMETER) [BLOOD GLUCOSE METER (GLUCOMETER)] Use 1 each As Directed as needed. Dispense glucometer, needle, and strip brand per patient's insurance at pharmacy discretion. 1 each 0      blood glucose test strips [BLOOD GLUCOSE TEST STRIPS] Test once daily. Dispense brand per patient's insurance at pharmacy discretion. 100 strip 3      blood pressure monitor Kit [BLOOD PRESSURE MONITOR KIT] Use 1 kit As Directed daily. 1 each 0      generic lancets (FINGERSTIX LANCETS) [GENERIC LANCETS (FINGERSTIX LANCETS)] Test once daily. Dispense brand per patient's insurance at pharmacy discretion. 100 each 3         ALLERGIES   Allergies   Allergen Reactions     Codeine Hives     Family is allergic to codeine so pt wants to stay away from it.      Metformin Diarrhea     Shrimp Itching     Shrimp [Shrimp Extract Allergy Skin Test] Itching     Shellfish  Containing Products [Shellfish-Derived Products] Rash         REVIEW OF SYSTEMS   A comprehensive review of systems was performed and was otherwise noncontributory.     OBJECTIVE   Vitals Blood pressure (!) 178/89, pulse 106, temperature 97.9  F (36.6  C), temperature source Oral, resp. rate 18, weight 56.9 kg (125 lb 6.4 oz), SpO2 95 %, not currently breastfeeding.           Physical  Exam   GENERAL: alert and oriented, well nourished in no apparent distress    SKIN: warm and dry, no rashes    HEENT: atraumatic, anicteric, moist mucous membranes, neck soft/supple     PULMONARY: normal resp effort, breath sounds clear to auscultation bilateral    CARDIOVASCULAR: normal rate and rhythm, no murmurs, no edema    ABDOMEN: no tenderness, no distention, bowel sounds normal. NG clamped    MUSCULOSKELETAL: joints and gait normal    NEUROLOGICAL: appropriate mental status, grossly intact  DERM: no rash, no jaundice    PSYCHIATRIC: normal mood, affect and insight        LABORATORY    ELECTROLYTE PANEL   Recent Labs   Lab 01/22/22  0651 01/21/22  2059 01/21/22  1743 01/21/22  0322 01/20/22 2246 01/20/22  0850     --   --   --  140 139   POTASSIUM 3.4*  --   --   --  3.9 4.1   CHLORIDE 109*  --   --   --  102 104   CO2 23  --   --   --  24 21*   * 137* 128*   < > 238* 211*   CR 0.65  --   --   --  0.82 0.80   BUN 12  --   --   --  15 13    < > = values in this interval not displayed.      HEMATOLOGY PANEL   Recent Labs   Lab 01/22/22  0651 01/21/22  0231 01/20/22 2246   HGB 11.7  --  14.6   MCV 81  --  81   WBC 9.2  --  9.3     --  322   INR  --  1.12  --       LIVER AND PANCREAS PANEL   Recent Labs   Lab 01/20/22 2246 01/20/22  0850   AST 21 20   ALT 26 24   ALKPHOS 104 106   BILITOTAL 0.7 0.7   LIPASE 13  --      IMAGING STUDIES    EXAM: XR UPPER GI WATER SOLUBLE  LOCATION: [facility]  DATE/TIME: 1/22/2022 10:01 AM     INDICATION: Nausea and vomiting. Evaluate for duodenal  obstruction.  COMPARISON: The CT from 01/20/2022.  TECHNIQUE: Routine.        FLUOROSCOPIC TIME: 0.7 minutes  NUMBER OF IMAGES: 10     PROCEDURE:  A  image was obtained. Gastrografin was instilled through the patient's existing decompressive enteric tube into the gastric lumen. Multiple fluoroscopic images were obtained in various projections.     FINDINGS:   The  image shows the large for decompressive gastric tube. Scattered loops of gas throughout nondilated loops of small and large bowel within the abdomen. Nothing for a perforated viscus.     Injection of contrast opacifies a nondistended stomach with a normal fold pattern. Contrast passes readily through the gastric pylorus into a nondilated duodenal bulb and first portion of the duodenum. The second through fourth portions of the duodenum   and proximal jejunum are patent however are markedly edematous with a narrow flow lumen through the segment. Allowing for differences in technique the appearances similar to the CT study from 2 days ago.                                                                         IMPRESSION:  1.  The duodenum and proximal jejunum are patent however markedly edematous resulting in a narrow flow lumen, similar to the CT study from 2 days ago.      EXAM: CT ABDOMEN PELVIS W CONTRAST  LOCATION: [facility]  DATE/TIME: 1/20/2022 11:19 PM     INDICATION: Right lower quadrant abdominal pain.  COMPARISON: PET/CT on 421  TECHNIQUE: CT scan of the abdomen and pelvis was performed following injection of IV contrast. Multiplanar reformats were obtained. Dose reduction techniques were used.  CONTRAST: [id number]     FINDINGS:   LOWER CHEST: Minimal change in pulmonary nodules posterior left lower lobe and anterior right middle lobe.     HEPATOBILIARY: Normal.     PANCREAS: Normal.     SPLEEN: Normal.     ADRENAL GLANDS: Normal.     KIDNEYS/BLADDER: Normal.     BOWEL: Prominent fluid distention of  stomach with dilated proximal jejunum demonstrating circumferential wall thickening suggesting intramural edema or hemorrhage at and just beyond the ligament of Treitz. There is a transitional point involving proximal   small bowel within left upper quadrant with remainder of small bowel decompressed and empty. Colon also decompressed. Etiology for the obstruction is uncertain, no mass is identified. No suggestion for volvulus. Uncertain if the proximal small bowel   wall thickening as the cause of the gastric outlet obstruction or the result of a focal obstruction just beyond the zone of wall thickening.     LYMPH NODES: Normal.     VASCULATURE: Unremarkable.     PELVIC ORGANS: Trace volume ascites. No adnexal lesion.     MUSCULOSKELETAL: No suspicious bony lesion.                                                                      IMPRESSION:   1.  High-grade proximal small bowel obstruction with prominent fluid distention of stomach. Prominent circumferential wall thickening involving distal most duodenum and proximal most jejunum, both intramural edema and hemorrhage could have this   appearance. There is no obvious volvulus or internal hernia but can't be excluded with etiology for the obstruction uncertain.    I have reviewed the current diagnostic and laboratory tests.           IMPRESSION   Sierra Pelaez is a 61 year old female with SBO due to duodenal/jejunal inflammation    SBO- now resolved. Very unusual story and location on imaging. Allergic or medication related effect are most likely but I am unable to elucidate a culprit trigger.   Lisinopril can certainly lead to increased bradykinin angiodema though she has been on this medication for years.   C1 esterase deficiency seems unlikely at 61  We discussed endoscopy as her area of inflammation is within reach of small bowel enteroscopy. Patient declines this procedure.   It is not likely that tissue would provide a diagnosis and reasonable to  decline    Symptoms now improving     May need to switch lisinopril given that this is her second episode.   Can advance diet in AM if tolerating clears and probably okay for discharge in AM              Benji Zambrano MD  Thank you for the opportunity to participate in the care of this patient.   Please feel free to call me with any questions or concerns.  Phone number [phone].

## 2022-01-22 NOTE — ED NOTES
Up to bathroom.  Had a very large BM and states feels much better. Spoke with hospitalist to update and she would like this writer to call surgeon to see if NG can be pulled. Surgeon paged.

## 2022-01-22 NOTE — ED NOTES
Spoke with Dr Vuong.  She would like the NG to stay in until evaluated by GI.  Dr Alonso paged to update

## 2022-01-22 NOTE — PLAN OF CARE
Problem: Pain Acute  Goal: Acceptable Pain Control and Functional Ability  Outcome: Improving     Pt denies having pain, denies having nausea. NG clamped, on a clear liquid diet. Has tolerated it well. Has had 2 large BM's today. Daughter present. Call light within reach.

## 2022-01-22 NOTE — ED NOTES
Up to bathroom. States passed some gas and a bit of mucous. Ambulates well. Spoke with radiology concerning upper GI KUB and needs provider to provider communication. XR will notify as soon as scheduled.

## 2022-01-22 NOTE — PLAN OF CARE
Problem: Adult Inpatient Plan of Care  Goal: Plan of Care Review  Outcome: Improving     Pt calls appropriately for needs. NG tube in place to LIS. Denies nausea. IV tylenol given for headache, which was effective. IV fluids running. Blood sugars WNL.

## 2022-01-22 NOTE — PROGRESS NOTES
General Surgery Progress Note    Subjective:   Patient is feeling better.  Has not had a bowel movement in few days.  Not having abdominal pain or nausea since placement of nasogastric tube.    She feels like she has had some recurrent symptoms of this.  This time she is blaming eating yogurt and fish together.  She also had an issue after eating mushrooms for a whole week.    Vitals:    01/21/22 1700 01/21/22 2019 01/21/22 2300 01/22/22 0212   BP:   (!) 146/77 (!) 170/93   BP Location:   Right arm Right arm   Patient Position:   Semi-Lopez's Semi-Lopez's   Pulse: 103 98 97    Resp: 16  16    Temp: 98.6  F (37  C)  97.7  F (36.5  C)    TempSrc: Oral  Oral    SpO2:  98% 96%    Weight:           01/21 0700 - 01/22 0659  In: 2293 [I.V.:2293]  Out: 450 [Urine:400]    Physical Exam:  General: NAD, pleasant  CV:RRR  LUNGS:CTA bilaterally  ABD: Soft, nondistended, nontender to palpation  EXT: No CCE    Recent Labs   Lab 01/22/22  0651   WBC 9.2   HGB 11.7   HCT 36.8          Recent Labs   Lab 01/22/22  0651 01/20/22  2246    140   CO2 23 24   BUN 12 15   ALBUMIN  --  4.1   ALKPHOS  --  104   ALT  --  26   AST  --  21       Assessment:   61-year-old female with metastatic breast cancer.  Presented with nausea, vomiting, and abdominal pain.  She was found to have a proximal SBO.    Plan:   Will obtain upper GI to evaluate contrast passage through the duodenum and proximal jejunum.  Does not need remainder of small bowel follow-through.  Maintain NG tube until after upper GI.        - If contrast freely flows through upper small bowel, remove NG tube, start regular diet, and discharge home if she tolerates regular diet.        -If does not freely flow, return NG tube to low intermittent suction.  Would then consider need for upper endoscopy.    Zoie Vuong DO  General Surgeon  [facility]  Surgery [facility] - [address]  [address]  [place] [address]  Office:  [phone]  Employed by [facility]

## 2022-01-22 NOTE — PROGRESS NOTES
[facility] MEDICINE PROGRESS NOTE      Identification/Summary: Sierra Pelaez is a 61 year old female with past medical history significant for dyslipidemia, type 2 diabetes mellitus, breast cancer who was admitted on 1/20/2022 for abdominal pain. Hospital course is notable for ongoing issues with abdominal fullness.  CT scan was suggestive of inflammation around The duodenum and jejunum    Assessment and Plan:   1/.  Concerns for small bowel obstruction:, Appreciate general surgery input and evaluation.  Patient has been passing flatus and had a large bowel movement at about noon today.  Additionally she does have bowel sounds.  Will decrease IV fluids and advance her diet to clear liquid diet.  2/.  Type 2 diabetes mellitus blood sugars under control hold off glipizide for now.  Discontinue Accu-Cheks and sliding scale insulin.  The latter is being done as per the request of patient.  3/.  Hypertension: Resume lisinopril discontinue Vasotec.  4/.  Due to tinnitus: Likely related to the food and the spice content.  Will start her on sucralfate as well as PPI twice daily.  No indication of EGD at this time  If needed this can be pursued as an outpatient    Nasogastric tube, I do suspect this can safely come out she has not put out anything in the NG except 50 cc in 12 hours.  However will consult with general surgery    Diet: Clear Liquid Diet  DVT Prophylaxis:  Enoxaparin (Lovenox) SQ  Code Status: Full Code    Anticipated possible discharge in 1-2 days to home once obstruction related milestones are met.    Kelsey Alonso MD, [facility] Medicine  [facility]  Phone: #[phone]    Interval History/Subjective:  Overnight patient states she is feeling much better, she does have some sensation of fullness in her abdomen.  She tells me she wants to have a bowel movement and drink some coffee    Physical Exam/Objective:  Temp:  [97.7  F (36.5  C)-98.6   F (37  C)] 97.7  F (36.5  C)  Pulse:  [] 97  Resp:  [16] 16  BP: (128-170)/() 170/93  SpO2:  [95 %-98 %] 96 %  Body mass index is 26.96 kg/m .    GENERAL:  Alert, appears comfortable, in no acute distress, appears stated age   HEAD:  Normocephalic, without obvious abnormality, atraumatic   EYES:  PERRL, conjunctiva/corneas clear, no scleral icterus, EOM's intact   NECK: Supple, symmetrical, trachea midline   BACK:   Symmetric, no curvature, ROM normal   LUNGS:   Clear to auscultation bilaterally, no rales, rhonchi, or wheezing, symmetric chest rise on inhalation, respirations unlabored   CHEST WALL:  No tenderness or deformity   HEART:  Regular rate and rhythm, S1 and S2 normal, no murmur, rub, or gallop    ABDOMEN:   Soft, non-tender, bowel sounds active all four quadrants, no masses, no organomegaly, no rebound or guarding   EXTREMITIES: Extremities normal, atraumatic, no cyanosis or edema    SKIN: Dry to touch, no exanthems in the visualized areas   NEURO: Alert, oriented x3, moves all four extremities freely   PSYCH: Cooperative, behavior is appropriate      Data reviewed today: I personally reviewed all new medications, labs, imaging/diagnostics reports over the past 24 hours. Pertinent findings include:    Imaging:   Recent Results (from the past 24 hour(s))   XR Upper GI Water Soluble    Narrative    EXAM: XR UPPER GI WATER SOLUBLE  LOCATION: [facility]  DATE/TIME: 1/22/2022 10:01 AM    INDICATION: Nausea and vomiting. Evaluate for duodenal obstruction.  COMPARISON: The CT from 01/20/2022.  TECHNIQUE: Routine.      FLUOROSCOPIC TIME: 0.7 minutes  NUMBER OF IMAGES: 10    PROCEDURE:  A  image was obtained. Gastrografin was instilled through the patient's existing decompressive enteric tube into the gastric lumen. Multiple fluoroscopic images were obtained in various projections.    FINDINGS:   The  image shows the large for decompressive gastric tube. Scattered  loops of gas throughout nondilated loops of small and large bowel within the abdomen. Nothing for a perforated viscus.    Injection of contrast opacifies a nondistended stomach with a normal fold pattern. Contrast passes readily through the gastric pylorus into a nondilated duodenal bulb and first portion of the duodenum. The second through fourth portions of the duodenum   and proximal jejunum are patent however are markedly edematous with a narrow flow lumen through the segment. Allowing for differences in technique the appearances similar to the CT study from 2 days ago.        Impression    IMPRESSION:  1.  The duodenum and proximal jejunum are patent however markedly edematous resulting in a narrow flow lumen, similar to the CT study from 2 days ago.       Labs:  Most Recent 3 CBC's:Recent Labs   Lab Test 01/22/22 0651 01/20/22 2246   WBC 9.2 9.3   HGB 11.7 14.6   MCV 81 81    322     Most Recent 3 BMP's:Recent Labs   Lab Test 01/22/22  0651 01/21/22 2059 01/21/22  1743 01/21/22 0322 01/20/22 2246 01/20/22  0850     --   --   --  140 139   POTASSIUM 3.4*  --   --   --  3.9 4.1   CHLORIDE 109*  --   --   --  102 104   CO2 23  --   --   --  24 21*   BUN 12  --   --   --  15 13   CR 0.65  --   --   --  0.82 0.80   ANIONGAP 13  --   --   --  14 14   ANIKA 8.6  --   --   --  10.5 10.2   * 137* 128*   < > 238* 211*    < > = values in this interval not displayed.     Most Recent 2 LFT's:Recent Labs   Lab Test 01/20/22 2246 01/20/22  0850   AST 21 20   ALT 26 24   ALKPHOS 104 106   BILITOTAL 0.7 0.7       Medications:   Personally Reviewed.  Medications     lactated ringers 100 mL/hr at 01/22/22 0021       insulin aspart  1-3 Units Subcutaneous Q6H     lisinopril  2.5 mg Oral Daily     pantoprazole  40 mg Oral BID AC     sodium chloride (PF)  3 mL Intracatheter Q8H     sucralfate  1 g Oral 4x Daily AC & HS

## 2022-01-23 VITALS
TEMPERATURE: 98 F | RESPIRATION RATE: 18 BRPM | DIASTOLIC BLOOD PRESSURE: 81 MMHG | OXYGEN SATURATION: 97 % | HEART RATE: 91 BPM | WEIGHT: 125.4 LBS | BODY MASS INDEX: 26.21 KG/M2 | SYSTOLIC BLOOD PRESSURE: 163 MMHG

## 2022-01-23 PROCEDURE — 250N000013 HC RX MED GY IP 250 OP 250 PS 637: Performed by: INTERNAL MEDICINE

## 2022-01-23 PROCEDURE — 99231 SBSQ HOSP IP/OBS SF/LOW 25: CPT | Performed by: SURGERY

## 2022-01-23 PROCEDURE — 99239 HOSP IP/OBS DSCHRG MGMT >30: CPT | Performed by: INTERNAL MEDICINE

## 2022-01-23 PROCEDURE — 258N000003 HC RX IP 258 OP 636: Performed by: INTERNAL MEDICINE

## 2022-01-23 RX ORDER — LOSARTAN POTASSIUM 25 MG/1
25 TABLET ORAL DAILY
Qty: 30 TABLET | Refills: 0 | Status: SHIPPED | OUTPATIENT
Start: 2022-01-24 | End: 2022-01-26

## 2022-01-23 RX ORDER — SUCRALFATE 1 G/1
1 TABLET ORAL
Qty: 40 TABLET | Refills: 0 | Status: SHIPPED | OUTPATIENT
Start: 2022-01-23 | End: 2022-02-02

## 2022-01-23 RX ORDER — LOSARTAN POTASSIUM 25 MG/1
25 TABLET ORAL DAILY
Status: DISCONTINUED | OUTPATIENT
Start: 2022-01-23 | End: 2022-01-23 | Stop reason: HOSPADM

## 2022-01-23 RX ORDER — PANTOPRAZOLE SODIUM 40 MG/1
40 TABLET, DELAYED RELEASE ORAL
Qty: 60 TABLET | Refills: 0 | Status: SHIPPED | OUTPATIENT
Start: 2022-01-23 | End: 2022-02-21

## 2022-01-23 RX ADMIN — LOSARTAN POTASSIUM 25 MG: 25 TABLET, FILM COATED ORAL at 10:17

## 2022-01-23 RX ADMIN — SUCRALFATE 1 G: 1 TABLET ORAL at 08:11

## 2022-01-23 RX ADMIN — SODIUM CHLORIDE, POTASSIUM CHLORIDE, SODIUM LACTATE AND CALCIUM CHLORIDE: 600; 310; 30; 20 INJECTION, SOLUTION INTRAVENOUS at 00:19

## 2022-01-23 RX ADMIN — PANTOPRAZOLE SODIUM 40 MG: 20 TABLET, DELAYED RELEASE ORAL at 07:05

## 2022-01-23 ASSESSMENT — ACTIVITIES OF DAILY LIVING (ADL)
ADLS_ACUITY_SCORE: 10

## 2022-01-23 NOTE — PROGRESS NOTES
GASTROENTEROLOGY PROGRESS NOTE     SUBJECTIVE:  Pain resolved. NG out. Tolerating regular diet this am. Had 3-4 stools yesterday, passing flatus.      OBJECTIVE:  BP (!) 163/81 (BP Location: Right arm)   Pulse 91   Temp 98  F (36.7  C) (Oral)   Resp 18   Wt 56.9 kg (125 lb 6.4 oz)   SpO2 97%   BMI 26.21 kg/m    Temp (24hrs), Av.9  F (36.6  C), Min:97.7  F (36.5  C), Max:98  F (36.7  C)    Patient Vitals for the past 72 hrs:   Weight   22 1305 56.9 kg (125 lb 6.4 oz)   22 2109 58.5 kg (129 lb)       Intake/Output Summary (Last 24 hours) at 2022 1052  Last data filed at 2022 0900  Gross per 24 hour   Intake 1429 ml   Output 100 ml   Net 1329 ml        PHYSICAL EXAM  Gen: alert, oriented, NAD  Abd: soft, NT, ND, +BS           Additional Comments:  ROS, FH, SH: See initial GI consult for details.     I have reviewed the patient's new clinical lab results:     Recent Labs   Lab Test 22  0651 22  0231 22   WBC 9.2  --  9.3   HGB 11.7  --  14.6   MCV 81  --  81     --  322   INR  --  1.12  --      Recent Labs   Lab Test 22  0651 226 22  0850   POTASSIUM 3.4* 3.9 4.1   CHLORIDE 109* 102 104   CO2 23 24 21*   BUN 12 15 13   ANIONGAP 13 14 14     Recent Labs   Lab Test 22  2246 22  0850 20  0937   ALBUMIN 4.1 4.0 4.2   BILITOTAL 0.7 0.7 0.6   ALT 26 24 77*   AST 21 20 45*   LIPASE 13  --   --      Imaging:  CT A/P 22:  IMPRESSION:   1.  High-grade proximal small bowel obstruction with prominent fluid distention of stomach. Prominent circumferential wall thickening involving distal most duodenum and proximal most jejunum, both intramural edema and hemorrhage could have this   appearance. There is no obvious volvulus or internal hernia but can't be excluded with etiology for the obstruction uncertain.    UGI/SBFT 22:  IMPRESSION:  1.  The duodenum and proximal jejunum are patent however markedly edematous resulting  in a narrow flow lumen, similar to the CT study from 2 days ago.    Assessment/Plan:  61 year old female admitted 1/21 with small bowel obstruction secondary to duodenal/jejunal inflammation.     1. SBO. Resolved clinically. Etiology not completely clear but allergic vs medication induced causes suspected. Lisinopril has been discontinued as this can cause angioedema. EGD deferred yesterday given clinical improvements. Surgery following and no plans for intervention.   --Low fiber diet as tolerated.   --Ok to discharge from GI perspective.    Reviewed with Dr. Zambrano.     Christina Ha, [facility] ([facility])

## 2022-01-23 NOTE — PROGRESS NOTES
General Surgery Progress Note  Hospital Day # 2    Subjective:   CC: Proximal small bowel obstruction  Status: Appears to be resolving, she feels better, was started on regular diet and eating pancakes with an NG tube in place that has been clamped overnight.  She has no nausea or discomfort    Vitals:    01/22/22 1315 01/22/22 1532 01/23/22 0020 01/23/22 0719   BP: (!) 169/90 (!) 178/89 (!) 154/85 (!) 163/81   BP Location: Right arm Right arm Right arm Right arm   Patient Position:   Semi-Lopez's    Pulse: 112 106 96 91   Resp: 18 18 16 18   Temp: 97.9  F (36.6  C) 97.9  F (36.6  C) 97.7  F (36.5  C) 98  F (36.7  C)   TempSrc: Oral Oral Oral Oral   SpO2: 96% 95% 96% 97%   Weight:           Physical Exam:  General: NAD, pleasant  ABD: Soft, nontender, nondistended  EXT:no CCE    Recent Labs   Lab 01/22/22  0651   WBC 9.2   HGB 11.7   HCT 36.8          Recent Labs   Lab 01/22/22  0651 01/20/22  2246    140   CO2 23 24   BUN 12 15   ALBUMIN  --  4.1   ALKPHOS  --  104   ALT  --  26   AST  --  21       Assessment: Proximal small bowel obstruction of unclear etiology    Plan: GI input noted  -Okay to DC NG tube at this point given the fact she is eating a regular diet and tolerating this without difficulty  -We will sign off    Tomas Balbuena DO [facility]  [phone]

## 2022-01-23 NOTE — DISCHARGE SUMMARY
[facility]  Hospitalist Discharge Summary      Date of Admission:  1/20/2022  Date of Discharge: January 23 2022.  Discharging Provider: Kelsey Alonso MD, MIHIR  Discharge Service: Hospitalist Service    Discharge Diagnoses   1/.  Partial small bowel obstruction now resolved with conservative measures  2/.  Duodenitis likely secondary to dietary choices  3/.  Type 2 diabetes mellitus  /.  Possible ACE inhibitor allergy        Discharge Disposition   Discharged to home  Condition at discharge: Stable      Hospital Course   61 year old female with past medical history significant for dyslipidemia, type 2 diabetes mellitus, breast cancer who was admitted on 1/20/2022 for abdominal pain. Hospital course is notable for ongoing issues with abdominal fullness.  CT scan was suggestive of inflammation around The duodenum and jejunum  She had an NG tube placed in the emergency department with minimal output, gastroenterology and general surgery were evaluating the patient.  With conservative measures, n.p.o. status and unremarkable oral Gastrografin patient was considered to be in no imminent danger, her diet was gradually increased and she subsequently had her NG tube pulled out.  After much discussion it becomes evident was that the patient has been consuming a lot of red spicy peppers which is likely contributing to her duodenitis.  She has been counseled not to consume the same and appropriate medications have been prescribed to her for the same  Additionally gastroenterology was a little bit concerned about this being an ACE inhibitor related allergy hence that has been discontinued and she has been started on an ARB    Consultations This Hospital Stay   SURGERY GENERAL IP CONSULT  SOCIAL WORK IP CONSULT  GASTROENTEROLOGY IP CONSULT    Code Status   Full Code    Time Spent on this Encounter   I, Kelsey Alonso MD, [facility], personally saw the patient today and spent greater than 30 minutes discharging  this patient.       Kelsey Alonso MD, [facility] [address]  [address] [place] [address]  Phone: [phone]  Fax: [phone]  ______________________________________________________________________    Physical Exam   Vital Signs: Temp: 98  F (36.7  C) Temp src: Oral BP: (!) 163/81 Pulse: 91   Resp: 18 SpO2: 97 % O2 Device: None (Room air)    Weight: 125 lbs 6.4 oz  General Appearance: Pleasant no acute distress  Respiratory: CTA  Cardiovascular: S1-S2 regular rate and rhythm  GI: Soft, nondistended bowel sounds are present  Skin: No rash or lesions  Other: No neurological deficit       Primary Care Physician   Elmer Emmanuel    Discharge Orders   No discharge procedures on file.    Significant Results and Procedures   Most Recent 3 CBC's:Recent Labs   Lab Test 01/22/22  0651 01/20/22 2246   WBC 9.2 9.3   HGB 11.7 14.6   MCV 81 81    322     Most Recent 3 BMP's:Recent Labs   Lab Test 01/22/22  0651 01/21/22 2059 01/21/22  1743 01/21/22 0322 01/20/22 2246 01/20/22  0850     --   --   --  140 139   POTASSIUM 3.4*  --   --   --  3.9 4.1   CHLORIDE 109*  --   --   --  102 104   CO2 23  --   --   --  24 21*   BUN 12  --   --   --  15 13   CR 0.65  --   --   --  0.82 0.80   ANIONGAP 13  --   --   --  14 14   ANIKA 8.6  --   --   --  10.5 10.2   * 137* 128*   < > 238* 211*    < > = values in this interval not displayed.     Most Recent 2 LFT's:Recent Labs   Lab Test 01/20/22 2246 01/20/22  0850   AST 21 20   ALT 26 24   ALKPHOS 104 106   BILITOTAL 0.7 0.7   ,   Results for orders placed or performed during the hospital encounter of 01/20/22   CT Abdomen Pelvis w Contrast    Narrative    EXAM: CT ABDOMEN PELVIS W CONTRAST  LOCATION: [facility]  DATE/TIME: 1/20/2022 11:19 PM    INDICATION: Right lower quadrant abdominal pain.  COMPARISON: PET/CT on 421  TECHNIQUE: CT scan of the abdomen and pelvis was performed following  injection of IV contrast. Multiplanar reformats were obtained. Dose reduction techniques were used.  CONTRAST: [id number]    FINDINGS:   LOWER CHEST: Minimal change in pulmonary nodules posterior left lower lobe and anterior right middle lobe.    HEPATOBILIARY: Normal.    PANCREAS: Normal.    SPLEEN: Normal.    ADRENAL GLANDS: Normal.    KIDNEYS/BLADDER: Normal.    BOWEL: Prominent fluid distention of stomach with dilated proximal jejunum demonstrating circumferential wall thickening suggesting intramural edema or hemorrhage at and just beyond the ligament of Treitz. There is a transitional point involving proximal   small bowel within left upper quadrant with remainder of small bowel decompressed and empty. Colon also decompressed. Etiology for the obstruction is uncertain, no mass is identified. No suggestion for volvulus. Uncertain if the proximal small bowel   wall thickening as the cause of the gastric outlet obstruction or the result of a focal obstruction just beyond the zone of wall thickening.    LYMPH NODES: Normal.    VASCULATURE: Unremarkable.    PELVIC ORGANS: Trace volume ascites. No adnexal lesion.    MUSCULOSKELETAL: No suspicious bony lesion.      Impression    IMPRESSION:   1.  High-grade proximal small bowel obstruction with prominent fluid distention of stomach. Prominent circumferential wall thickening involving distal most duodenum and proximal most jejunum, both intramural edema and hemorrhage could have this   appearance. There is no obvious volvulus or internal hernia but can't be excluded with etiology for the obstruction uncertain.   XR Upper GI Water Soluble    Narrative    EXAM: XR UPPER GI WATER SOLUBLE  LOCATION: [facility]  DATE/TIME: 1/22/2022 10:01 AM    INDICATION: Nausea and vomiting. Evaluate for duodenal obstruction.  COMPARISON: The CT from 01/20/2022.  TECHNIQUE: Routine.      FLUOROSCOPIC TIME: 0.7 minutes  NUMBER OF IMAGES: 10    PROCEDURE:  A   image was obtained. Gastrografin was instilled through the patient's existing decompressive enteric tube into the gastric lumen. Multiple fluoroscopic images were obtained in various projections.    FINDINGS:   The  image shows the large for decompressive gastric tube. Scattered loops of gas throughout nondilated loops of small and large bowel within the abdomen. Nothing for a perforated viscus.    Injection of contrast opacifies a nondistended stomach with a normal fold pattern. Contrast passes readily through the gastric pylorus into a nondilated duodenal bulb and first portion of the duodenum. The second through fourth portions of the duodenum   and proximal jejunum are patent however are markedly edematous with a narrow flow lumen through the segment. Allowing for differences in technique the appearances similar to the CT study from 2 days ago.        Impression    IMPRESSION:  1.  The duodenum and proximal jejunum are patent however markedly edematous resulting in a narrow flow lumen, similar to the CT study from 2 days ago.       Discharge Medications   Discharge Medication List as of 1/23/2022 11:16 AM      START taking these medications    Details   losartan (COZAAR) 25 MG tablet Take 1 tablet (25 mg) by mouth daily, Disp-30 tablet, R-0, Local Print      pantoprazole (PROTONIX) 40 MG EC tablet Take 1 tablet (40 mg) by mouth 2 times daily (before meals), Disp-60 tablet, R-0, Local Print      sucralfate (CARAFATE) 1 GM tablet Take 1 tablet (1 g) by mouth 4 times daily (before meals and nightly) for 10 days, Disp-40 tablet, R-0, Local Print         CONTINUE these medications which have NOT CHANGED    Details   albuterol (PROAIR HFA/PROVENTIL HFA/VENTOLIN HFA) 108 (90 Base) MCG/ACT inhaler Inhale 2 puffs into the lungs every 6 hours, Disp-18 g, R-0, E-PrescribePharmacy may dispense brand covered by insurance (Proair, or proventil or ventolin or generic albuterol inhaler)      atorvastatin (LIPITOR) 20  MG tablet Take 1 tablet (20 mg) by mouth daily, Disp-90 tablet, R-1, E-Prescribe      everolimus (AFINITOR) 10 MG tablet Take 10 mg by mouth daily, Historical      exemestane (AROMASIN) 25 mg tablet Take 25 mg by mouth daily , Historical      glipiZIDE (GLUCOTROL) 5 MG tablet Take 1 tablet (5 mg) by mouth daily, Disp-90 tablet, R-1, E-Prescribe      blood glucose (NO BRAND SPECIFIED) lancets standard Use to test blood sugar 1 times daily or as directed. Dispense brand per insurance preferenceDisp-100 each, [id number]      !! blood glucose (NO BRAND SPECIFIED) test strip Use to test blood sugar 1 times daily or as directed. Ok to dispense per insurance preference, Disp-100 strip, R-0, E-Prescribe      blood glucose meter (GLUCOMETER) [BLOOD GLUCOSE METER (GLUCOMETER)] Use 1 each As Directed as needed. Dispense glucometer, needle, and strip brand per patient's insurance at pharmacy discretion.Disp-1 each, R-0Normal      !! blood glucose test strips [BLOOD GLUCOSE TEST STRIPS] Test once daily. Dispense brand per patient's insurance at pharmacy discretion., Disp-100 strip, R-3, NormalDM 2      blood pressure monitor Kit [BLOOD PRESSURE MONITOR KIT] Use 1 kit As Directed daily., Disp-1 each, R-0, Normal      generic lancets (FINGERSTIX LANCETS) [GENERIC LANCETS (FINGERSTIX LANCETS)] Test once daily. Dispense brand per patient's insurance at pharmacy discretion., Disp-100 each, R-3, NormalDm type 2       !! - Potential duplicate medications found. Please discuss with provider.      STOP taking these medications       ibuprofen (ADVIL,MOTRIN) 600 MG tablet Comments:   Reason for Stopping:         lisinopril (ZESTRIL) 30 MG tablet Comments:   Reason for Stopping:             Allergies   Allergies   Allergen Reactions     Codeine Hives     Family is allergic to codeine so pt wants to stay away from it.      Metformin Diarrhea     Shrimp Itching     Shrimp [Shrimp Extract Allergy Skin Test] Itching     Shellfish  Containing Products [Shellfish-Derived Products] Rash

## 2022-01-23 NOTE — PLAN OF CARE
Problem: Adult Inpatient Plan of Care  Goal: Plan of Care Review  Outcome: Improving     Problem: Pain Acute  Goal: Acceptable Pain Control and Functional Ability  Outcome: Improving     Pt denies pain. Denies N/V. NG in place and clamped. Pt had 3 large BM yesterday and tolerated clear liquids okay. Maybe trial advancing diet today. Pt observed resting comfortably in bed.

## 2022-01-23 NOTE — PROGRESS NOTES
Pt vitals stable- intake small but no problems with nausea/ vomiting. Tolerating fluids and meds without problems. Up to BR independently. NG removed, IV removed- reviewed discharge instructions, follow up plan and medication changes.     Pt discharged with all belongings and discharge papers. Son and spouse here to  pt.         Problem: Adult Inpatient Plan of Care  Goal: Absence of Hospital-Acquired Illness or Injury  Intervention: Identify and Manage Fall Risk  Recent Flowsheet Documentation  Taken 1/23/2022 0830 by Bárbara Acuña RN  Safety Promotion/Fall Prevention:    supervised activity    nonskid shoes/slippers when out of bed    lighting adjusted  Intervention: Prevent and Manage VTE (Venous Thromboembolism) Risk  Recent Flowsheet Documentation  Taken 1/23/2022 0830 by Bárbara Acuña RN  VTE Prevention/Management: ambulation promoted

## 2022-01-23 NOTE — PLAN OF CARE
Problem: Adult Inpatient Plan of Care  Goal: Absence of Hospital-Acquired Illness or Injury  Intervention: Identify and Manage Fall Risk  Recent Flowsheet Documentation  Taken 1/23/2022 0830 by Bárbara Acuña RN  Safety Promotion/Fall Prevention:   supervised activity   nonskid shoes/slippers when out of bed   lighting adjusted  Intervention: Prevent and Manage VTE (Venous Thromboembolism) Risk  Recent Flowsheet Documentation  Taken 1/23/2022 0830 by Bárbara Acuña RN  VTE Prevention/Management: ambulation promoted

## 2022-01-26 ENCOUNTER — OFFICE VISIT (OUTPATIENT)
Dept: FAMILY MEDICINE | Facility: CLINIC | Age: 62
End: 2022-01-26
Payer: COMMERCIAL

## 2022-01-26 VITALS
WEIGHT: 129.6 LBS | DIASTOLIC BLOOD PRESSURE: 82 MMHG | BODY MASS INDEX: 27.09 KG/M2 | HEART RATE: 92 BPM | SYSTOLIC BLOOD PRESSURE: 176 MMHG

## 2022-01-26 DIAGNOSIS — K56.609 SMALL BOWEL OBSTRUCTION (H): Primary | ICD-10-CM

## 2022-01-26 DIAGNOSIS — C50.919 MALIGNANT NEOPLASM OF FEMALE BREAST, UNSPECIFIED ESTROGEN RECEPTOR STATUS, UNSPECIFIED LATERALITY, UNSPECIFIED SITE OF BREAST (H): ICD-10-CM

## 2022-01-26 DIAGNOSIS — I10 BENIGN ESSENTIAL HYPERTENSION: ICD-10-CM

## 2022-01-26 PROCEDURE — 99214 OFFICE O/P EST MOD 30 MIN: CPT | Performed by: NURSE PRACTITIONER

## 2022-01-26 RX ORDER — LOSARTAN POTASSIUM 50 MG/1
50 TABLET ORAL DAILY
Qty: 90 TABLET | Refills: 0 | Status: SHIPPED | OUTPATIENT
Start: 2022-01-26 | End: 2022-03-18

## 2022-01-26 NOTE — LETTER
[facility]  [address]  [place] [address]  Phone: [phone]  Fax: [phone]                 To whom it may concern,          Sierra Pelaez,  1960, was hospitalized from 22-22.  She has been given instructions to avoid travel until 2022 as she continues to heal.     If possible, I would ask that you waive any fees related to needing to change her flight due to a medical emergency.      If you have any questions, please do not hesitate to reach out. I am usually in the office -.          Elmer Emmanuel, CNP

## 2022-01-26 NOTE — PROGRESS NOTES
Chief Complaint   Patient presents with     Hospital F/U     medication questions. stopped all meds on Thursday has not started again.      Health Maintenance     How long to follow low fiber diet.        HPI: Patient presents today following her hospital admission from 1/20/2022-1/23/2022.  She noted significant bloating and abdominal fullness/pain.  CT scan was suggestive of inflammation around the duodenum and jejunum and so an NG tube was placed due to concerns of small bowel obstruction.  Minimal output was noted in NG tube and general surgery and gastroenterology were consulted.  Both recommended conservative treatment with n.p.o. status.  Patient underwent oral gastric and study which was unremarkable.  GI thought that with the inflammatory changes that may be this was an allergic response to her ACE inhibitor which she has been on for some time now.  They switched her over to losartan.  It was also found that the patient had been eating quite a bit of spicy peppers which they thought could have been contributing to her duodenitis.  She was given prescriptions for suckle fate and pantoprazole which she continues today.    Since discharge patient has been doing well.  Having regular bowel movements since Saturday.  Blood pressure is quite elevated today but patient denies chest pain, palpitations, lightheadedness, dizziness, numbness, tingling.  She has pushed off her trip to [place] until the end of February because of this illness.  She has stopped her treatments for breast cancer.    ROS:Review of Systems - negative except for what's listed in the HPI    SH: The Patient's  reports that she has never smoked. She has never used smokeless tobacco. She reports previous alcohol use. She reports that she does not use drugs.      FH: The Patient's family history includes Breast Cancer in her sister; Cancer in her mother; Hyperlipidemia in her brother; Hypertension in her mother; Lymphoma in her sister; Transient  ischemic attack in her father.     Meds:    Current Outpatient Medications   Medication     atorvastatin (LIPITOR) 20 MG tablet     glipiZIDE (GLUCOTROL) 5 MG tablet     losartan (COZAAR) 50 MG tablet     pantoprazole (PROTONIX) 40 MG EC tablet     sucralfate (CARAFATE) 1 GM tablet     albuterol (PROAIR HFA/PROVENTIL HFA/VENTOLIN HFA) 108 (90 Base) MCG/ACT inhaler     blood glucose (NO BRAND SPECIFIED) lancets standard     blood glucose (NO BRAND SPECIFIED) test strip     blood glucose meter (GLUCOMETER)     blood glucose test strips     blood pressure monitor Kit     everolimus (AFINITOR) 10 MG tablet     exemestane (AROMASIN) 25 mg tablet     generic lancets (FINGERSTIX LANCETS)     No current facility-administered medications for this visit.       O:  BP (!) 176/82   Pulse 92   Wt 58.8 kg (129 lb 9.6 oz)   LMP  (LMP Unknown)   Breastfeeding No   BMI 27.09 kg/m      Physical Examination:   General appearance - alert, well appearing, and in no distress  Mental status - alert, oriented to person, place, and time  Mouth - mucous membranes moist. No oral lesions.  Lymphatics - no palpable lymphadenopathy  Chest - clear to auscultation, no wheezes, rales or rhonchi, symmetric air entry  Heart - normal rate and regular rhythm, S1 and S2 normal, no murmurs noted  Abdomen - soft, nontender, nondistended, no masses or organomegaly  Neurological - alert, oriented, normal speech, no focal findings or movement disorder noted, neck supple without rigidity, cranial nerves II through XII intact, motor and sensory grossly normal bilaterally, normal muscle tone, no tremors, strength 5/5  Extremities - peripheral pulses normal, no peripheral edema  Skin - normal coloration and turgor.      A/P:       ICD-10-CM    1. Small bowel obstruction (H)  K56.609    2. Malignant neoplasm of female breast, unspecified estrogen receptor status, unspecified laterality, unspecified site of breast (H)  C50.919    3. Benign essential  hypertension  I10 losartan (COZAAR) 50 MG tablet     Reviewed ED note x1, surgery note x1, GI note x1, hospitalist note x1, CT x1, x-ray gastric and study x1, metabolic panel x1, CBC x1.    Doing considerably better.  Uncontrolled hypertension.  Increase losartan to 50 mg.  Continue checking blood pressure.  Continue low fiber diet.  Patient has stopped her cancer treatment in favor of homeopathic options.  She requests that my note be sent to her oncologist office which I've asked the clinical staff to do. Letter written for the airlines.      Small bowel obstruction (H)    Malignant neoplasm of female breast, unspecified estrogen receptor status, unspecified laterality, unspecified site of breast (H)    Benign essential hypertension  - losartan (COZAAR) 50 MG tablet  Dispense: 90 tablet; Refill: 0        Elmer Emmanuel, CNP      This note has been dictated using voice recognition software. Any grammatical or context distortions are unintentional and inherent to the software.

## 2022-01-26 NOTE — PATIENT INSTRUCTIONS
Note provided for the airline.    The losartan they started isn't a high enough dose. Let's increase to 50mg which I sent to your pharmacy.  You can use 2 of the 25mg until you run.     We want blood pressure to be under 140/90     some over the counter hydrocortisone cream for the itching arm.      Do the low fiber diet for 1 month to give your stomach a chance to rest.      Patient Education     Low-Fiber Diet     Eggs are high in protein and easy to digest.   Eating a low-fiber diet means eating foods that don t have much fiber. These foods are easy to digest.   Most of the fiber that you eat passes undigested through your bowel. This is what forms stool. Low-fiber foods can help to slow down your bowel movements. When you eat a low-fiber diet, you have fewer stools. This lets your intestine rest.   Your healthcare provider will tell you how long you need to be on this diet. It may only be for a short time. Low-fiber foods often don t give you all the nutrients you need to stay healthy. Your healthcare provider may have you take certain vitamins while you are on this diet.   Reasons to eat a low-fiber diet  The goal of a low-fiber diet is to limit the size and number of your stools. It may be prescribed if you:     Are going through chemotherapy or radiation treatments    Have had intestinal surgery    Have trouble digesting food    Have a condition that affects your intestine, such as diarrhea, irritable bowel syndrome, Crohn s disease, ulcerative colitis, or diverticulitis  General guidelines for a low-fiber diet  In general, a low-fiber diet means having fewer than 13 grams of fiber a day. Your healthcare provider may give you a list of things you can and can t eat or drink. Read food labels. Choose foods and drinks that have as close to zero grams of fiber as possible. Here are general guidelines to follow:   Breads, pasta, cereal, rice, and other starches (6 to 11 servings daily)    What to choose:  white bread, biscuits, muffins, and white rolls; plain crackers; waffles; white pasta; white rice; cream of wheat; grits; white pancakes; corn flakes; cooked potatoes without skin; pretzels. Fiber content of these foods should be less than 0.5 (1/2) gram per serving.    What to pass up: whole-wheat or whole-grain breads, crackers, and pasta; breads with seeds or nuts; wheat germ; roberto crackers; cornbread; wild or brown rice; cereals with whole-grain, bran, and granola; cereals with seeds, nuts, coconut, or dried fruit; potatoes with skin  Milk and dairy (2 servings daily)    What to choose: milk, buttermilk; yogurt or ice cream without seeds or nuts; custard or pudding; sour cream; cheese and cottage cheese; cream sauces, soups, and casseroles    What to pass up: ice cream and yogurt with seeds, nuts, or fruit chunks  Fruit (2 to 4 servings daily)    What to choose: ripe banana; ripe nectarine, peach, apricot, papaya, and plum; soft honeydew melon and cantaloupe; cooked or canned fruit without skin or seeds (not sweetened with sorbitol); applesauce; strained fruit juice (without pulp)    What to pass up: raw or dried fruit; all berries; raisins; canned and raw pineapple; prunes and prune juice; fruit juice with pulp  Vegetables (3 to 5 servings daily)    What to choose: well-cooked or canned vegetables without seeds, such as spinach, eggplant, green and wax beans, carrots, yellow squash, pumpkin; lettuce on a sandwich    What to pass up: all raw or steamed vegetables; vegetables with seeds, such as unstrained tomato sauce; green peas; lima beans; broccoli; corn; parsnips  Meats and protein (4 to 6 ounces daily)    What to choose: tender, well-cooked meat, including ground meat, poultry, and fish; eggs; tofu; creamy peanut butter    What to pass up: processed meats such as hot dogs and sausages, tough, chewy meat with gristle; peas, including split, yellow, and black-eyed; beans, including navy, lima, black,  garbanzo, soy, benjamin, and lentil; peanuts and crunchy peanut butter   Fats, oils, sauces, condiments (fewer than 8 teaspoons daily)    What to choose: butter, margarine, oils, whipped cream, sour cream, mayonnaise, smooth dressings and sauces; plain gravy; smooth condiments    What to pass up: dressing with seeds or fruit chunks; pickles and relishes  Other foods and drinks    What to choose: water; plain gelatin; plain puddings; pretzels; plain cookies and cakes; honey, syrup; decaffeinated drinks, including tea and coffee      What to pass up: popcorn; potato chips; spicy foods; fried, greasy foods; alcohol (ask your healthcare provider); marmalade, jam, and preserves; desserts that have seeds, nuts, coconut, dried fruit, whole grains, or bran; candy that has seeds or nuts; drinks sweetened with sorbitol or other sugar substitutes; caffeinated drinks, including tea, coffee, soda, and energy drinks    [employer] last reviewed this educational content on 5/1/2020 2000-2021 [employer]. All rights reserved. This information is not intended as a substitute for professional medical care. Always follow your healthcare professional's instructions.

## 2022-01-27 ENCOUNTER — DOCUMENTATION ONLY (OUTPATIENT)
Dept: FAMILY MEDICINE | Facility: CLINIC | Age: 62
End: 2022-01-27
Payer: COMMERCIAL

## 2022-01-27 NOTE — PROGRESS NOTES
Office visit note from 1/26/22 was internally faxed to Anne-Marie Giraldo at [facility].     Anusha Fritz, CMA

## 2022-02-11 DIAGNOSIS — R05.9 COUGH: ICD-10-CM

## 2022-02-15 DIAGNOSIS — R05.9 COUGH: ICD-10-CM

## 2022-02-15 RX ORDER — ALBUTEROL SULFATE 90 UG/1
2 AEROSOL, METERED RESPIRATORY (INHALATION) EVERY 6 HOURS
Qty: 18 G | Refills: 3 | Status: SHIPPED | OUTPATIENT
Start: 2022-02-15

## 2022-02-15 NOTE — TELEPHONE ENCOUNTER
"Routing refill request to provider for review/approval because:  Patient reports taking medication differently.    Last Written Prescription Date:  1/20/22  Last Fill Quantity: 18 g,  # refills: 0   Last office visit provider:  1/26/22     Requested Prescriptions   Pending Prescriptions Disp Refills     albuterol (PROAIR HFA/PROVENTIL HFA/VENTOLIN HFA) 108 (90 Base) MCG/ACT inhaler [Pharmacy Med Name: ALBUTEROL HFA INH(200 PUFFS)18GM] 18 g 0     Sig: INHALE 2 PUFFS INTO THE LUNGS EVERY 6 HOURS       Asthma Maintenance Inhalers - Anticholinergics Passed - 2/15/2022 10:17 AM        Passed - Patient is age 12 years or older        Passed - Recent (12 mo) or future (30 days) visit within the authorizing provider's specialty     Patient has had an office visit with the authorizing provider or a provider within the authorizing providers department within the previous 12 mos or has a future within next 30 days. See \"Patient Info\" tab in inbasket, or \"Choose Columns\" in Meds & Orders section of the refill encounter.              Passed - Medication is active on med list       Short-Acting Beta Agonist Inhalers Protocol  Passed - 2/15/2022 10:17 AM        Passed - Patient is age 12 or older        Passed - Recent (12 mo) or future (30 days) visit within the authorizing provider's specialty     Patient has had an office visit with the authorizing provider or a provider within the authorizing providers department within the previous 12 mos or has a future within next 30 days. See \"Patient Info\" tab in inbasket, or \"Choose Columns\" in Meds & Orders section of the refill encounter.              Passed - Medication is active on med list             Andrea Marte RN 02/15/22 10:17 AM  "

## 2022-02-17 RX ORDER — ALBUTEROL SULFATE 90 UG/1
2 AEROSOL, METERED RESPIRATORY (INHALATION) EVERY 6 HOURS
Qty: 54 G | OUTPATIENT
Start: 2022-02-17

## 2022-02-18 DIAGNOSIS — K52.9 ILEITIS: ICD-10-CM

## 2022-02-21 RX ORDER — PANTOPRAZOLE SODIUM 40 MG/1
TABLET, DELAYED RELEASE ORAL
Qty: 60 TABLET | Refills: 11 | Status: SHIPPED | OUTPATIENT
Start: 2022-02-21

## 2022-02-21 NOTE — TELEPHONE ENCOUNTER
"Last Written Prescription Date:  1/26/2022  Last Fill Quantity: 60,  # refills: 0   Last office visit provider:  1/26/2022     Requested Prescriptions   Pending Prescriptions Disp Refills     pantoprazole (PROTONIX) 40 MG EC tablet [Pharmacy Med Name: PANTOPRAZOLE 40MG TABLETS] 60 tablet 0     Sig: TAKE 1 TABLET BY MOUTH TWICE DAILY BEFORE MEALS       PPI Protocol Passed - 2/21/2022 11:30 AM        Passed - Not on Clopidogrel (unless Pantoprazole ordered)        Passed - No diagnosis of osteoporosis on record        Passed - Recent (12 mo) or future (30 days) visit within the authorizing provider's specialty     Patient has had an office visit with the authorizing provider or a provider within the authorizing providers department within the previous 12 mos or has a future within next 30 days. See \"Patient Info\" tab in inbasket, or \"Choose Columns\" in Meds & Orders section of the refill encounter.              Passed - Medication is active on med list        Passed - Patient is age 18 or older        Passed - No active pregnacy on record        Passed - No positive pregnancy test in past 12 months             Florence Griffith RN 02/21/22 11:32 AM  "

## 2022-03-17 DIAGNOSIS — I10 BENIGN ESSENTIAL HYPERTENSION: ICD-10-CM

## 2022-03-18 RX ORDER — LOSARTAN POTASSIUM 50 MG/1
TABLET ORAL
Qty: 90 TABLET | Refills: 1 | Status: SHIPPED | OUTPATIENT
Start: 2022-03-18 | End: 2023-04-17

## 2022-03-18 NOTE — TELEPHONE ENCOUNTER
"Routing refill request to provider for review/approval because:  Labs out of range:  K+  BP not in range.    Last Written Prescription Date:  1/26/22  Last Fill Quantity: 90,  # refills: 0   Last office visit provider:  1/26/22     Requested Prescriptions   Pending Prescriptions Disp Refills     losartan (COZAAR) 50 MG tablet [Pharmacy Med Name: LOSARTAN 50MG TABLETS] 90 tablet 0     Sig: TAKE 1 TABLET(50 MG) BY MOUTH DAILY       Angiotensin-II Receptors Failed - 3/18/2022  8:38 AM        Failed - Last blood pressure under 140/90 in past 12 months     BP Readings from Last 3 Encounters:   01/26/22 (!) 176/82   01/23/22 (!) 163/81   01/20/22 130/88                 Failed - Normal serum potassium on file in past 12 months     Recent Labs   Lab Test 01/22/22  0651   POTASSIUM 3.4*                    Passed - Recent (12 mo) or future (30 days) visit within the authorizing provider's specialty     Patient has had an office visit with the authorizing provider or a provider within the authorizing providers department within the previous 12 mos or has a future within next 30 days. See \"Patient Info\" tab in inbasket, or \"Choose Columns\" in Meds & Orders section of the refill encounter.              Passed - Medication is active on med list        Passed - Patient is age 18 or older        Passed - No active pregnancy on record        Passed - Normal serum creatinine on file in past 12 months     Recent Labs   Lab Test 01/22/22  0651   CR 0.65       Ok to refill medication if creatinine is low          Passed - No positive pregnancy test in past 12 months             Andrea Marte RN 03/18/22 8:38 AM  "

## 2022-03-24 NOTE — PROGRESS NOTES
Pt given new medication to try   Pt has okay for her  to visit tonight, he will be her one designated visitor. (She is a boarder/inpatient).

## 2022-04-23 DIAGNOSIS — E11.9 TYPE 2 DIABETES MELLITUS WITHOUT COMPLICATION, WITHOUT LONG-TERM CURRENT USE OF INSULIN (H): ICD-10-CM

## 2022-04-26 RX ORDER — BLOOD SUGAR DIAGNOSTIC
STRIP MISCELLANEOUS
Qty: 100 STRIP | Refills: 1 | Status: SHIPPED | OUTPATIENT
Start: 2022-04-26

## 2022-04-26 NOTE — TELEPHONE ENCOUNTER
"Last Written Prescription Date:  1/20/2022  Last Fill Quantity: 100,  # refills: 0   Last office visit provider:  1/26/2022     Requested Prescriptions   Pending Prescriptions Disp Refills     ACCU-CHEK GUIDE test strip [Pharmacy Med Name: ACCU-CHEK GUIDE TEST STRIPS 100S] 100 strip 0     Sig: USE AS DIRECTED TO TEST ONCE DAILY       Diabetic Supplies Protocol Passed - 4/26/2022  5:52 PM        Passed - Medication is active on med list        Passed - Patient is 18 years of age or older        Passed - Recent (6 mo) or future (30 days) visit within the authorizing provider's specialty     Patient had office visit in the last 6 months or has a visit in the next 30 days with authorizing provider.  See \"Patient Info\" tab in inbasket, or \"Choose Columns\" in Meds & Orders section of the refill encounter.                 Florence Griffith RN 04/26/22 5:52 PM    "

## 2022-05-28 ENCOUNTER — HEALTH MAINTENANCE LETTER (OUTPATIENT)
Age: 62
End: 2022-05-28

## 2022-07-23 ENCOUNTER — HEALTH MAINTENANCE LETTER (OUTPATIENT)
Age: 62
End: 2022-07-23

## 2022-10-01 ENCOUNTER — HEALTH MAINTENANCE LETTER (OUTPATIENT)
Age: 62
End: 2022-10-01

## 2023-02-04 ENCOUNTER — HEALTH MAINTENANCE LETTER (OUTPATIENT)
Age: 63
End: 2023-02-04

## 2023-04-14 DIAGNOSIS — I10 BENIGN ESSENTIAL HYPERTENSION: ICD-10-CM

## 2023-04-15 NOTE — TELEPHONE ENCOUNTER
"Routing refill request to provider for review/approval because:  Patient needs to be seen because it has been more than 1 year since last office visit.    Last Written Prescription Date:  3/18/22  Last Fill Quantity: 90,  # refills: 1   Last office visit provider:  1/26/22     Requested Prescriptions   Pending Prescriptions Disp Refills     losartan (COZAAR) 50 MG tablet [Pharmacy Med Name: LOSARTAN 50MG TABLETS] 90 tablet 1     Sig: TAKE 1 TABLET(50 MG) BY MOUTH DAILY       Angiotensin-II Receptors Failed - 4/14/2023  5:38 PM        Failed - Last blood pressure under 140/90 in past 12 months     BP Readings from Last 3 Encounters:   01/26/22 (!) 176/82   01/23/22 (!) 163/81   01/20/22 130/88                 Failed - Recent (12 mo) or future (30 days) visit within the authorizing provider's specialty     Patient has had an office visit with the authorizing provider or a provider within the authorizing providers department within the previous 12 mos or has a future within next 30 days. See \"Patient Info\" tab in inbasket, or \"Choose Columns\" in Meds & Orders section of the refill encounter.              Failed - Normal serum creatinine on file in past 12 months     Recent Labs   Lab Test 01/22/22  0651   CR 0.65       Ok to refill medication if creatinine is low          Failed - Normal serum potassium on file in past 12 months     Recent Labs   Lab Test 01/22/22  0651   POTASSIUM 3.4*                    Passed - Medication is active on med list        Passed - Patient is age 18 or older        Passed - No active pregnancy on record        Passed - No positive pregnancy test in past 12 months             Katherin Worrell, RN 04/15/23 4:37 PM  "

## 2023-04-17 RX ORDER — LOSARTAN POTASSIUM 50 MG/1
TABLET ORAL
Qty: 90 TABLET | Refills: 0 | Status: SHIPPED | OUTPATIENT
Start: 2023-04-17

## 2023-05-13 ENCOUNTER — HEALTH MAINTENANCE LETTER (OUTPATIENT)
Age: 63
End: 2023-05-13

## 2023-08-06 ENCOUNTER — HEALTH MAINTENANCE LETTER (OUTPATIENT)
Age: 63
End: 2023-08-06

## 2023-10-15 ENCOUNTER — HEALTH MAINTENANCE LETTER (OUTPATIENT)
Age: 63
End: 2023-10-15

## 2023-12-24 ENCOUNTER — HEALTH MAINTENANCE LETTER (OUTPATIENT)
Age: 63
End: 2023-12-24

## 2024-03-03 ENCOUNTER — HEALTH MAINTENANCE LETTER (OUTPATIENT)
Age: 64
End: 2024-03-03

## 2024-07-21 ENCOUNTER — HEALTH MAINTENANCE LETTER (OUTPATIENT)
Age: 64
End: 2024-07-21

## 2024-12-07 ENCOUNTER — HEALTH MAINTENANCE LETTER (OUTPATIENT)
Age: 64
End: 2024-12-07

## 2025-01-18 ENCOUNTER — HEALTH MAINTENANCE LETTER (OUTPATIENT)
Age: 65
End: 2025-01-18